# Patient Record
Sex: FEMALE | Race: WHITE | NOT HISPANIC OR LATINO | URBAN - NONMETROPOLITAN AREA
[De-identification: names, ages, dates, MRNs, and addresses within clinical notes are randomized per-mention and may not be internally consistent; named-entity substitution may affect disease eponyms.]

---

## 2015-09-17 LAB — HM PAP SMEAR: NORMAL

## 2017-01-16 VITALS
HEIGHT: 68 IN | WEIGHT: 193 LBS | DIASTOLIC BLOOD PRESSURE: 70 MMHG | BODY MASS INDEX: 29.25 KG/M2 | SYSTOLIC BLOOD PRESSURE: 128 MMHG

## 2017-01-18 ENCOUNTER — OFFICE VISIT (OUTPATIENT)
Dept: OBSTETRICS AND GYNECOLOGY | Facility: CLINIC | Age: 27
End: 2017-01-18

## 2017-01-18 ENCOUNTER — RESULTS ENCOUNTER (OUTPATIENT)
Dept: OBSTETRICS AND GYNECOLOGY | Facility: CLINIC | Age: 27
End: 2017-01-18

## 2017-01-18 VITALS
WEIGHT: 205 LBS | DIASTOLIC BLOOD PRESSURE: 72 MMHG | HEIGHT: 69 IN | BODY MASS INDEX: 30.36 KG/M2 | SYSTOLIC BLOOD PRESSURE: 122 MMHG

## 2017-01-18 DIAGNOSIS — N97.0 INFERTILITY ASSOCIATED WITH ANOVULATION: ICD-10-CM

## 2017-01-18 DIAGNOSIS — N97.0 INFERTILITY ASSOCIATED WITH ANOVULATION: Primary | ICD-10-CM

## 2017-01-18 PROCEDURE — 99213 OFFICE O/P EST LOW 20 MIN: CPT | Performed by: OBSTETRICS & GYNECOLOGY

## 2017-01-18 NOTE — MR AVS SNAPSHOT
Pavithra Carter   1/18/2017 1:40 PM   Office Visit    Dept Phone:  [phone]   Encounter #:  [id number]    Provider:  Zaria Thompson MD   Department:  [facility]                Your Full Care Plan              Today's Medication Changes          These changes are accurate as of: 1/18/17  2:33 PM.  If you have any questions, ask your nurse or doctor.               New Medication(s)Ordered:     clomiPHENE 50 MG tablet   Commonly known as:  CLOMID   1 po days 5-9   Started by:  Zaria Thompson MD            Where to Get Your Medications      These medications were sent to [facility] [place], [place] - [address] [facility] - [phone]  - [phone] FX  [address] [facility] [place] [address]     Phone:  [phone]     clomiPHENE 50 MG tablet                  Your Updated Medication List          This list is accurate as of: 1/18/17  2:33 PM.  Always use your most recent med list.                clomiPHENE 50 MG tablet   Commonly known as:  CLOMID   1 po days 5-9               You Were Diagnosed With        Codes Comments    Infertility associated with anovulation    -  Primary ICD-10-CM: N97.0  ICD-9-CM: 628.0       Instructions    Clomiphene tablets  What is this medicine?  CLOMIPHENE (KLOE mi feen) is a fertility drug that increases the chance of pregnancy. It helps women ovulate (produce a mature egg) during their cycle.  This medicine may be used for other purposes; ask your health care provider or pharmacist if you have questions.  What should I tell my health care provider before I take this medicine?  They need to know if you have any of these conditions:  -adrenal gland disease  -blood vessel disease or blood clots  -cyst on the ovary  -endometriosis  -liver disease  -ovarian cancer  -pituitary gland disease  -vaginal bleeding that has not been evaluated  -an unusual or allergic reaction to clomiphene, other medicines, foods, dyes, or  preservatives  -pregnant (should not be used if you are already pregnant)  -breast-feeding  How should I use this medicine?  Take this medicine by mouth with a glass of water. Follow the directions on the prescription label. Take exactly as directed for the exact number of days prescribed. Take your doses at regular intervals. Most women take this medicine for a 5 day period, but the length of treatment may be adjusted. Your doctor will give you a start date for this medication and will give you instructions on proper use. Do not take your medicine more often than directed.  Talk to your pediatrician regarding the use of this medicine in children. Special care may be needed.  Overdosage: If you think you have taken too much of this medicine contact a poison control center or emergency room at once.  NOTE: This medicine is only for you. Do not share this medicine with others.  What if I miss a dose?  If you miss a dose, take it as soon as you can. If it is almost time for your next dose, take only that dose. Do not take double or extra doses.  What may interact with this medicine?  -herbal or dietary supplements, like blue cohosh, black cohosh, chasteberry, or DHEA  -prasterone  This list may not describe all possible interactions. Give your health care provider a list of all the medicines, herbs, non-prescription drugs, or dietary supplements you use. Also tell them if you smoke, drink alcohol, or use illegal drugs. Some items may interact with your medicine.  What should I watch for while using this medicine?  Make sure you understand how and when to use this medicine. You need to know when you are ovulating and when to have sexual intercourse. This will increase the chance of a pregnancy.  Visit your doctor or health care professional for regular checks on your progress. You may need tests to check the hormone levels in your blood or you may have to use home-urine tests to check for ovulation. Try to keep any  appointments.  Compared to other fertility treatments, this medicine does not greatly increase your chances of having multiple babies. An increased chance of having twins may occur in roughly 5 out of every 100 women who take this medication.  Stop taking this medicine at once and contact your doctor or health care professional if you think you are pregnant.  This medicine is not for long-term use. Most women that benefit from this medicine do so within the first three cycles (months). Your doctor or health care professional will monitor your condition. This medicine is usually used for a total of 6 cycles of treatment.  You may get drowsy or dizzy. Do not drive, use machinery, or do anything that needs mental alertness until you know how this drug affects you. Do not stand or sit up quickly. This reduces the risk of dizzy or fainting spells.  Drinking alcoholic beverages or smoking tobacco may decrease your chance of becoming pregnant. Limit or stop alcohol and tobacco use during your fertility treatments.  What side effects may I notice from receiving this medicine?  Side effects that you should report to your doctor or health care professional as soon as possible:  -allergic reactions like skin rash, itching or hives, swelling of the face, lips, or tongue  -breathing problems  -changes in vision  -fluid retention  -nausea, vomiting  -pelvic pain or bloating  -severe abdominal pain  -sudden weight gain  Side effects that usually do not require medical attention (report to your doctor or health care professional if they continue or are bothersome):  -breast discomfort  -hot flashes  -mild pelvic discomfort  -mild nausea  This list may not describe all possible side effects. Call your doctor for medical advice about side effects. You may report side effects to FDA at 3-433-FDA-7387.  Where should I keep my medicine?  Keep out of the reach of children.  Store at room temperature between 15 and 30 degrees C (59 and 86  "degrees F). Protect from heat, light, and moisture. Throw away any unused medicine after the expiration date.  NOTE: This sheet is a summary. It may not cover all possible information. If you have questions about this medicine, talk to your doctor, pharmacist, or health care provider.     © 2016, Elsevier/[employer] Standard. (2009 22:21:06)       Patient Instructions History      Upcoming Appointments     Visit Type Date Time Department    GYN FOLLOW UP 2017  1:40 PM [facility]      [employer] Signup     [facility] [employer] allows you to send messages to your doctor, view your test results, renew your prescriptions, schedule appointments, and more. To sign up, go to [employer] and click on the Sign Up Now link in the New User? box. Enter your [employer] Activation Code exactly as it appears below along with the last four digits of your Social Security Number and your Date of Birth () to complete the sign-up process. If you do not sign up before the expiration date, you must request a new code.    [employer] Activation Code: [email or web address]  Expires: 2017  2:33 PM    If you have questions, you can email [email or web address] or call [phone] to talk to our [employer] staff. Remember, [employer] is NOT to be used for urgent needs. For medical emergencies, dial 911.               Other Info from Your Visit           Allergies     No Known Allergies      Reason for Visit     Follow-up follow up discuss HSG, wants to discuss Clomid      Vital Signs     Blood Pressure Height Weight Last Menstrual Period Breastfeeding? Body Mass Index    122/72 69\" (175.3 cm) 205 lb (93 kg) 2016 No 30.27 kg/m2    Smoking Status                   Never Smoker           Problems and Diagnoses Noted     Infertility associated with anovulation    -  Primary        "

## 2017-01-18 NOTE — PATIENT INSTRUCTIONS
Clomiphene tablets  What is this medicine?  CLOMIPHENE (KLOE mi feen) is a fertility drug that increases the chance of pregnancy. It helps women ovulate (produce a mature egg) during their cycle.  This medicine may be used for other purposes; ask your health care provider or pharmacist if you have questions.  What should I tell my health care provider before I take this medicine?  They need to know if you have any of these conditions:  -adrenal gland disease  -blood vessel disease or blood clots  -cyst on the ovary  -endometriosis  -liver disease  -ovarian cancer  -pituitary gland disease  -vaginal bleeding that has not been evaluated  -an unusual or allergic reaction to clomiphene, other medicines, foods, dyes, or preservatives  -pregnant (should not be used if you are already pregnant)  -breast-feeding  How should I use this medicine?  Take this medicine by mouth with a glass of water. Follow the directions on the prescription label. Take exactly as directed for the exact number of days prescribed. Take your doses at regular intervals. Most women take this medicine for a 5 day period, but the length of treatment may be adjusted. Your doctor will give you a start date for this medication and will give you instructions on proper use. Do not take your medicine more often than directed.  Talk to your pediatrician regarding the use of this medicine in children. Special care may be needed.  Overdosage: If you think you have taken too much of this medicine contact a poison control center or emergency room at once.  NOTE: This medicine is only for you. Do not share this medicine with others.  What if I miss a dose?  If you miss a dose, take it as soon as you can. If it is almost time for your next dose, take only that dose. Do not take double or extra doses.  What may interact with this medicine?  -herbal or dietary supplements, like blue cohosh, black cohosh, chasteberry, or DHEA  -prasterone  This list may not describe  all possible interactions. Give your health care provider a list of all the medicines, herbs, non-prescription drugs, or dietary supplements you use. Also tell them if you smoke, drink alcohol, or use illegal drugs. Some items may interact with your medicine.  What should I watch for while using this medicine?  Make sure you understand how and when to use this medicine. You need to know when you are ovulating and when to have sexual intercourse. This will increase the chance of a pregnancy.  Visit your doctor or health care professional for regular checks on your progress. You may need tests to check the hormone levels in your blood or you may have to use home-urine tests to check for ovulation. Try to keep any appointments.  Compared to other fertility treatments, this medicine does not greatly increase your chances of having multiple babies. An increased chance of having twins may occur in roughly 5 out of every 100 women who take this medication.  Stop taking this medicine at once and contact your doctor or health care professional if you think you are pregnant.  This medicine is not for long-term use. Most women that benefit from this medicine do so within the first three cycles (months). Your doctor or health care professional will monitor your condition. This medicine is usually used for a total of 6 cycles of treatment.  You may get drowsy or dizzy. Do not drive, use machinery, or do anything that needs mental alertness until you know how this drug affects you. Do not stand or sit up quickly. This reduces the risk of dizzy or fainting spells.  Drinking alcoholic beverages or smoking tobacco may decrease your chance of becoming pregnant. Limit or stop alcohol and tobacco use during your fertility treatments.  What side effects may I notice from receiving this medicine?  Side effects that you should report to your doctor or health care professional as soon as possible:  -allergic reactions like skin rash,  itching or hives, swelling of the face, lips, or tongue  -breathing problems  -changes in vision  -fluid retention  -nausea, vomiting  -pelvic pain or bloating  -severe abdominal pain  -sudden weight gain  Side effects that usually do not require medical attention (report to your doctor or health care professional if they continue or are bothersome):  -breast discomfort  -hot flashes  -mild pelvic discomfort  -mild nausea  This list may not describe all possible side effects. Call your doctor for medical advice about side effects. You may report side effects to FDA at 9-112-FDA-5497.  Where should I keep my medicine?  Keep out of the reach of children.  Store at room temperature between 15 and 30 degrees C (59 and 86 degrees F). Protect from heat, light, and moisture. Throw away any unused medicine after the expiration date.  NOTE: This sheet is a summary. It may not cover all possible information. If you have questions about this medicine, talk to your doctor, pharmacist, or health care provider.     © 2016, Elsevier/Gold Standard. (2009-03-30 22:21:06)

## 2017-01-19 NOTE — PROGRESS NOTES
17    Pavithra Averyton    1990      Chief Complaint   Patient presents with   • Follow-up     follow up discuss HSG, wants to discuss Clomid       Patient is 26 y.o.  here for evaluation of infertility.  The patient gives a history of infertility for the last 3 years.  The patient previously conceived in  unexpectedly.  The patient had a  section.  The patient reports regular menses occurring every 29 days.  Menses last 4 days occasionally painful in nature but not heavy.  The patient has been using ovulatory kits and reports minimal detection of ovulation over the last 4 months.  The patient took Clomid in September and reports positive ovulation with confirmation with a progesterone level.  The patient also had an HSG on 2016 which revealed no obstruction of the fallopian tubes.  The patient's current partner is also the father of her previous child.  He has also had a urologic examination and semen analysis which is normal.  The patient is currently on a multivitamin with folic acid.  The patient's last menstrual period was 2016.      History reviewed. No pertinent past medical history.        No current outpatient prescriptions on file prior to visit.     No current facility-administered medications on file prior to visit.          Past Surgical History   Procedure Laterality Date   •  section  2009           Social History     Social History   • Marital status:      Spouse name: N/A   • Number of children: N/A   • Years of education: N/A     Social History Main Topics   • Smoking status: Never Smoker   • Smokeless tobacco: Never Used   • Alcohol use No   • Drug use: No   • Sexual activity: Yes     Partners: Male     Other Topics Concern   • None     Social History Narrative         Review of Systems   Constitutional: Negative.    HENT: Negative.    Eyes: Negative.    Respiratory: Negative.    Cardiovascular: Negative.    Gastrointestinal: Negative.   "  Endocrine: Negative.    Musculoskeletal: Negative.    Skin: Negative.    Allergic/Immunologic: Negative.    Neurological: Negative.    Hematological: Negative.    Psychiatric/Behavioral: Negative.                  Vitals:    17 1406   BP: 122/72   Weight: 205 lb (93 kg)   Height: 69\" (175.3 cm)       Physical Exam   Constitutional: She is oriented to person, place, and time. She appears well-developed and well-nourished. No distress.   HENT:   Head: Normocephalic and atraumatic.   Eyes: Conjunctivae are normal.   Neck: Normal range of motion. Neck supple. No thyromegaly present.   Abdominal: Soft. Bowel sounds are normal. She exhibits no distension and no mass. There is no tenderness. There is no rebound and no guarding.   Neurological: She is alert and oriented to person, place, and time.   Skin: Skin is warm and dry. She is not diaphoretic.   Psychiatric: She has a normal mood and affect. Her behavior is normal.   Nursing note and vitals reviewed.        1. Infertility associated with anovulation/unknown  Patient has a three-year history of infertility with evaluation as noted above.  The various options were discussed with the patient.  Decision was made to proceed with a trial of Clomid therapy.  Instructions and precautions were given.  The patient understands the need for continued urine ovulatory assessment every month.  The patient also will return for a serum progesterone level on day 21 or 22 of her cycle.  If the patient does have ovulation and no conception over the next 3-4 cycles the patient is to consider possible diagnostic laparoscopy.  The patient may also consider referral to a reproductive endocrinologist.  - Progesterone; Future      Requested Prescriptions     Signed Prescriptions Disp Refills   • clomiPHENE (CLOMID) 50 MG tablet 5 tablet 3     Si po days 5-9       Return in about 4 months (around 2017).    Zaria Thompson MD  "

## 2017-02-17 LAB — PROGEST SERPL-MCNC: 18.7 NG/ML

## 2017-03-17 DIAGNOSIS — N83.9 PROBLEMS WITH OVULATION: Primary | ICD-10-CM

## 2017-03-18 LAB — PROGEST SERPL-MCNC: 16.2 NG/ML

## 2017-04-06 VITALS
DIASTOLIC BLOOD PRESSURE: 70 MMHG | BODY MASS INDEX: 29.25 KG/M2 | SYSTOLIC BLOOD PRESSURE: 128 MMHG | WEIGHT: 193 LBS | HEIGHT: 68 IN

## 2017-04-25 ENCOUNTER — INITIAL PRENATAL (OUTPATIENT)
Dept: OBSTETRICS AND GYNECOLOGY | Facility: CLINIC | Age: 27
End: 2017-04-25

## 2017-04-25 VITALS — DIASTOLIC BLOOD PRESSURE: 82 MMHG | WEIGHT: 207 LBS | SYSTOLIC BLOOD PRESSURE: 136 MMHG | BODY MASS INDEX: 30.57 KG/M2

## 2017-04-25 DIAGNOSIS — Z34.90 PREGNANCY, UNSPECIFIED GESTATIONAL AGE: Primary | ICD-10-CM

## 2017-04-25 DIAGNOSIS — O30.041 DICHORIONIC DIAMNIOTIC TWIN PREGNANCY IN FIRST TRIMESTER: ICD-10-CM

## 2017-04-25 PROBLEM — O30.049 DICHORIONIC DIAMNIOTIC TWIN GESTATION: Status: ACTIVE | Noted: 2017-04-25

## 2017-04-25 PROCEDURE — 0501F PRENATAL FLOW SHEET: CPT | Performed by: MIDWIFE

## 2017-04-25 PROCEDURE — 76817 TRANSVAGINAL US OBSTETRIC: CPT | Performed by: MIDWIFE

## 2017-04-25 RX ORDER — PRENATAL VIT NO.126/IRON/FOLIC 28MG-0.8MG
TABLET ORAL DAILY
COMMUNITY

## 2017-04-25 NOTE — PROGRESS NOTES
@SUBJECTIVEBEGIN  Chief Complaint   Patient presents with   • Initial Prenatal Visit     lmp 17, no complaints, TWINS       Pavithra Carter is a 26 y.o. year old .  Patient's last menstrual period was 2017 (approximate).  She presents to be seen to initiate prenatal care. She had HSG in November. C/O a lot of nausea, daily, occasional vomiting. Trying to eat small frequent meals and change vitamins to different times.    Smoking status: Never Smoker                                                              Smokeless status: Never Used                          The following portions of the patient's history were reviewed and updated as appropriate:vital signs, allergies, current medications, past medical history, past social history, past surgical history and problem list.    EXAM:  HEENT: WNL  Thyroid: no masses  Heart: RRR without murmur  Lungs: CTAB  Abdomen: soft nontender  Perineum: normal  Vagina: pink, moist  Cervix: Closed thick -CMT  Extremities; no edema    Lab Review   No data reviewed    Imaging   Pelvic ultrasound report  Twin A 8w3d, Twin B 8w2d, dichorionic and diamniotic    ASSESSMENT  1. IUP at 8w2d  2. Supervision of pregnancy  3. Twin pregnancy - DC/DA    PLAN  1. The problem list for pregnancy was initiated today  2. Tests ordered today:  Orders Placed This Encounter   Procedures   • US Ob Transvaginal     Order Specific Question:   Reason for Exam:     Answer:   NOB, dates   • Obstetric Panel     3. Testing for GC / Chlamydia was done today  4. Information reviewed: exercise in pregnancy, nutrition in pregnancy, weight gain in pregnancy, work and travel restrictions during pregnancy, list of OTC medications acceptable in pregnancy and call coverage groups    Follow up: 4 week(s)    This note was electronically signed.  KARLA Doshi  2017

## 2017-04-26 LAB
ABO GROUP BLD: (no result)
BASOPHILS # BLD AUTO: 0 X10E3/UL (ref 0–0.2)
BASOPHILS NFR BLD AUTO: 0 %
BLD GP AB SCN SERPL QL: NEGATIVE
EOSINOPHIL # BLD AUTO: 0.1 X10E3/UL (ref 0–0.4)
EOSINOPHIL NFR BLD AUTO: 1 %
ERYTHROCYTE [DISTWIDTH] IN BLOOD BY AUTOMATED COUNT: 14.1 % (ref 12.3–15.4)
HBV SURFACE AG SERPL QL IA: NEGATIVE
HCT VFR BLD AUTO: 42.3 % (ref 34–46.6)
HGB BLD-MCNC: 14.7 G/DL (ref 11.1–15.9)
IMM GRANULOCYTES # BLD: 0 X10E3/UL (ref 0–0.1)
IMM GRANULOCYTES NFR BLD: 0 %
LYMPHOCYTES # BLD AUTO: 2.5 X10E3/UL (ref 0.7–3.1)
LYMPHOCYTES NFR BLD AUTO: 18 %
MCH RBC QN AUTO: 28.9 PG (ref 26.6–33)
MCHC RBC AUTO-ENTMCNC: 34.8 G/DL (ref 31.5–35.7)
MCV RBC AUTO: 83 FL (ref 79–97)
MONOCYTES # BLD AUTO: 0.8 X10E3/UL (ref 0.1–0.9)
MONOCYTES NFR BLD AUTO: 6 %
NEUTROPHILS # BLD AUTO: 10.5 X10E3/UL (ref 1.4–7)
NEUTROPHILS NFR BLD AUTO: 75 %
PLATELET # BLD AUTO: 287 X10E3/UL (ref 150–379)
RBC # BLD AUTO: 5.08 X10E6/UL (ref 3.77–5.28)
RH BLD: POSITIVE
RPR SER QL: NON REACTIVE
RUBV IGG SERPL IA-ACNC: 1.45 INDEX
WBC # BLD AUTO: 14 X10E3/UL (ref 3.4–10.8)

## 2017-05-01 ENCOUNTER — TELEPHONE (OUTPATIENT)
Dept: OBSTETRICS AND GYNECOLOGY | Facility: CLINIC | Age: 27
End: 2017-05-01

## 2017-05-01 RX ORDER — PROMETHAZINE HYDROCHLORIDE 25 MG/1
12.5 TABLET ORAL EVERY 6 HOURS PRN
Qty: 20 TABLET | Refills: 0 | Status: SHIPPED | OUTPATIENT
Start: 2017-05-01 | End: 2017-11-09

## 2017-05-03 DIAGNOSIS — O30.041 DICHORIONIC DIAMNIOTIC TWIN PREGNANCY IN FIRST TRIMESTER: ICD-10-CM

## 2017-05-23 ENCOUNTER — ROUTINE PRENATAL (OUTPATIENT)
Dept: OBSTETRICS AND GYNECOLOGY | Facility: CLINIC | Age: 27
End: 2017-05-23

## 2017-05-23 VITALS — SYSTOLIC BLOOD PRESSURE: 136 MMHG | BODY MASS INDEX: 31.45 KG/M2 | WEIGHT: 213 LBS | DIASTOLIC BLOOD PRESSURE: 74 MMHG

## 2017-05-23 DIAGNOSIS — O30.041 DICHORIONIC DIAMNIOTIC TWIN PREGNANCY IN FIRST TRIMESTER: ICD-10-CM

## 2017-05-23 PROCEDURE — 0502F SUBSEQUENT PRENATAL CARE: CPT | Performed by: MIDWIFE

## 2017-06-20 ENCOUNTER — ROUTINE PRENATAL (OUTPATIENT)
Dept: OBSTETRICS AND GYNECOLOGY | Facility: CLINIC | Age: 27
End: 2017-06-20

## 2017-06-20 VITALS — SYSTOLIC BLOOD PRESSURE: 128 MMHG | WEIGHT: 211 LBS | BODY MASS INDEX: 31.16 KG/M2 | DIASTOLIC BLOOD PRESSURE: 70 MMHG

## 2017-06-20 DIAGNOSIS — O30.041 DICHORIONIC DIAMNIOTIC TWIN PREGNANCY IN FIRST TRIMESTER: ICD-10-CM

## 2017-06-20 DIAGNOSIS — Z34.92 NORMAL PREGNANCY, SECOND TRIMESTER: Primary | ICD-10-CM

## 2017-06-20 PROCEDURE — 99213 OFFICE O/P EST LOW 20 MIN: CPT | Performed by: NURSE PRACTITIONER

## 2017-06-20 NOTE — PROGRESS NOTES
Chief Complaint   Patient presents with   • Routine Prenatal Visit     Twins.. Pt states that shes having some swelling in hands & face        HPI  , 16w3d reports thinks some facial, [place]  Appetite is fine - some nause 3 x wk -     Working full time  Hx of preeclampsia   Hx C/S      ROS  /70  Wt 211 lb (95.7 kg)  LMP 2017 (Approximate)  BMI 31.16 kg/m2 -See Prenatal Assessment    EXAM  Constitutional:  calm  HEENT:  No obvious facial swelling   Heart/Lungs:  Abdomen:  Fundal ht/ FHT's / FM see prenatal flow sheet  Extremeties:  Neg edema  V/E:     MDM  Impression: Supervision of pregnancy   Previous C/S with planned repeat C/S  Twin pregnancy - DC/DA   Tests done today: u/s for FHT's    Topics discussed: adeq rest and fluids - nutrition rev'd - option for phenergan   Tests next visit: U/s - anatomical structure

## 2017-07-10 ENCOUNTER — ROUTINE PRENATAL (OUTPATIENT)
Dept: OBSTETRICS AND GYNECOLOGY | Facility: CLINIC | Age: 27
End: 2017-07-10

## 2017-07-10 VITALS — WEIGHT: 217 LBS | SYSTOLIC BLOOD PRESSURE: 134 MMHG | DIASTOLIC BLOOD PRESSURE: 68 MMHG | BODY MASS INDEX: 32.05 KG/M2

## 2017-07-10 DIAGNOSIS — O30.042 DICHORIONIC DIAMNIOTIC TWIN PREGNANCY IN SECOND TRIMESTER: ICD-10-CM

## 2017-07-10 PROCEDURE — 0502F SUBSEQUENT PRENATAL CARE: CPT | Performed by: NURSE PRACTITIONER

## 2017-07-10 NOTE — PROGRESS NOTES
Chief Complaint   Patient presents with   • Routine Prenatal Visit     swelling and veins in legs         HPI  , 19w2d reports doing well     ROS  /68  Wt 217 lb (98.4 kg)  LMP 2017 (Approximate)  BMI 32.05 kg/m2 -See Prenatal Assessment    EXAM  Constitutional:    HEENT:  Heart/Lungs:  Abdomen:  Fundal ht/ FHT's / FM see prenatal flow sheet  Extremeties:    V/E:     MDM  Impression: Supervision of pregnancy   Twin pregnancy - DC/DA  prominent superficial varicosity    Tests done today: u/s for anantomical structure     Topics discussed: LE varicosity / compression hose / work /   U/S BOY /  GIRL   Names: ? Boy  / Dorothy Girl    Tests next visit: none

## 2017-08-07 ENCOUNTER — ROUTINE PRENATAL (OUTPATIENT)
Dept: OBSTETRICS AND GYNECOLOGY | Facility: CLINIC | Age: 27
End: 2017-08-07

## 2017-08-07 VITALS — DIASTOLIC BLOOD PRESSURE: 70 MMHG | SYSTOLIC BLOOD PRESSURE: 128 MMHG | BODY MASS INDEX: 32.34 KG/M2 | WEIGHT: 219 LBS

## 2017-08-07 DIAGNOSIS — O30.042 DICHORIONIC DIAMNIOTIC TWIN PREGNANCY IN SECOND TRIMESTER: ICD-10-CM

## 2017-08-07 PROCEDURE — 0502F SUBSEQUENT PRENATAL CARE: CPT | Performed by: OBSTETRICS & GYNECOLOGY

## 2017-08-07 NOTE — PROGRESS NOTES
No chief complaint on file.       HPI:   , 23w2d gestation reports doing well, Di/DI twins, prior 39 week C/S with preeclampsisa    ROS:  See Prenatal Episode/Flowsheet  /70  Wt 219 lb (99.3 kg)  LMP 2017 (Approximate)  BMI 32.34 kg/m2     EXAM:  EXTREMITIES:  No swelling-See Prenatal Episode/Flowsheet    ABDOMEN:  FHTs/Movement noted-See Prenatal Episode/Flowsheet    URINE GLUCOSE/PROTEIN:  See Prenatal Episode/Flowsheet    PELVIC EXAM:  See Prenatal Episode/Flowsheet    MDM:    Lab Results   Component Value Date    HGB 14.7 2017    HEPBSAG Negative 2017    ABO A 2017    RH Positive 2017    ABSCRN Negative 2017       Gluocla CBC next time

## 2017-08-07 NOTE — PATIENT INSTRUCTIONS
Multiple Pregnancy  Having a multiple pregnancy means you are carrying twins, triplets, or more. The majority of multiple pregnancies are twins. Naturally conceiving triplets or more (higher-order multiples) is quite rare.  Multiple pregnancies can sometimes be riskier than single pregnancies. You are more likely to have certain problems. For example, you are at higher risk for high blood pressure during pregnancy (preeclampsia). You may need to have more frequent appointments for prenatal care.   CAUSES   Sometimes your body releases more than one egg at a time. Having more than one egg fertilized by more than one sperm is the most common type of multiple pregnancy. Twins produced this way are fraternal. They are no more alike than other siblings.  Multiple pregnancies also result when one sperm fertilizes one egg and then that egg divides into more than one embryo. These are identical twins or triplets. Identical multiples are always the same gender and look very much alike.  RISK FACTORS  You may be at higher risk for having a multiple pregnancy if:  · You are older than 35.  · You have already had four or more children.  · You have a family history of multiple pregnancy.  · You have had fertility treatment.  SIGNS AND SYMPTOMS  Early signs and symptoms of multiple pregnancy include:  · Rapid weight gain in the first 3 months of pregnancy (first trimester).  · The uterus measuring larger than normal for your stage of pregnancy.  · More severe nausea.  · A higher-than-normal level of human chorionic gonadotropin (HCG). This is a hormone that your body produces in early pregnancy.  DIAGNOSIS   Your health care provider may suspect a multiple pregnancy from your symptoms and a physical exam. The results of your HCG blood test may also suggest a multiple pregnancy. Your health care provider will confirm that you are carrying multiples by doing an ultrasound exam. This exam involves using sound waves and a computer to  get an image of the inside of your uterus. An ultrasound exam can confirm a multiple pregnancy after you have been pregnant for 6-8 weeks.  TREATMENT   If you have a multiple pregnancy, you may need:  · Early screening for possible birth defects or genetic abnormalities.  · Frequent pelvic exams to check for signs of early labor.  · Frequent ultrasound exams during the second trimester.  · Frequent checks for high blood pressure and for protein in your urine. These are signs of preeclampsia.  · An anti-inflammatory medicine (corticosteroid) if you go into early labor. This medicine helps your babies' lungs mature.  · Magnesium sulfate to prevent cerebral palsy in your babies if you are expected to deliver before 32 weeks.  ·  delivery if vaginal delivery is too dangerous.  HOME CARE INSTRUCTIONS   Because your pregnancy may be considered high risk, you have to work closely with your health care team. You may also need to make some lifestyle changes. These may include the following:  · Increase your nutrition.  ¨ Gaining about 40-50 lb (18-23 kg) is recommended when you are pregnant with multiples. Try to gain 1 lb (0.5 kg) a week for the first 20 weeks of your pregnancy.  ¨ Your health care provider will let you know how many calories you should add to your diet each day.  ¨ Eat healthy snacks often throughout the day. This can add calories and reduce nausea.  · Drink enough fluid to keep your urine clear or pale yellow.  · Take your prenatal vitamins.  · Take 4248-9546 mg of calcium daily starting at the 20th week of pregnancy until you deliver your babies.  · By 20-24 weeks, you may need to limit your activities.  ¨ Avoid strenuous activity and work.  ¨ Ask your health care provider when you should stop having sexual intercourse.  ¨ Rest often.  · Do not smoke.  · Do not drink alcohol.  · Arrange for extra help around the house.  · Keep all your prenatal appointments.  SEEK MEDICAL CARE IF:  · You have  dizziness.  · You have mild pelvic cramps, pelvic pressure, or nagging pain in the abdominal or low back area.  · You have persistent nausea, vomiting, or diarrhea.  · You have a bad smelling vaginal discharge.  · You have pain with urination.  · You are having trouble gaining weight.  · You notice increased swelling in your face, hands, legs, or ankles.  · You have a fever.  SEEK IMMEDIATE MEDICAL CARE IF:   · You are leaking fluid from your vagina.  · You have spotting or bleeding from your vagina.  · You have severe abdominal cramping or pain.  · You have rapid weight gain or loss.  · You vomit blood or material that looks like coffee grounds.  · You are exposed to [place] measles and have never had them.  · You are exposed to fifth disease or chickenpox.  · You develop a severe headache.  · You have shortness of breath.     This information is not intended to replace advice given to you by your health care provider. Make sure you discuss any questions you have with your health care provider.     Document Released: 09/26/2009 Document Revised: 04/10/2017 Document Reviewed: 11/21/2014  [employer] Interactive Patient Education ©2017 [employer]

## 2017-08-15 ENCOUNTER — ROUTINE PRENATAL (OUTPATIENT)
Dept: OBSTETRICS AND GYNECOLOGY | Facility: CLINIC | Age: 27
End: 2017-08-15

## 2017-08-15 VITALS — DIASTOLIC BLOOD PRESSURE: 66 MMHG | SYSTOLIC BLOOD PRESSURE: 128 MMHG | WEIGHT: 219 LBS | BODY MASS INDEX: 32.34 KG/M2

## 2017-08-15 DIAGNOSIS — O26.892 VAGINAL DISCHARGE IN PREGNANCY, SECOND TRIMESTER: ICD-10-CM

## 2017-08-15 DIAGNOSIS — O22.00 VARICOSE VEINS DURING PREGNANCY, ANTEPARTUM: ICD-10-CM

## 2017-08-15 DIAGNOSIS — O22.42 HEMORRHOIDS DURING PREGNANCY IN SECOND TRIMESTER: Primary | ICD-10-CM

## 2017-08-15 DIAGNOSIS — N89.8 VAGINAL DISCHARGE IN PREGNANCY, SECOND TRIMESTER: ICD-10-CM

## 2017-08-15 DIAGNOSIS — O30.042 DICHORIONIC DIAMNIOTIC TWIN PREGNANCY IN SECOND TRIMESTER: ICD-10-CM

## 2017-08-15 PROBLEM — O22.40 HEMORRHOIDS IN PREGNANCY: Status: ACTIVE | Noted: 2017-08-15

## 2017-08-15 PROCEDURE — 0502F SUBSEQUENT PRENATAL CARE: CPT | Performed by: MIDWIFE

## 2017-08-15 NOTE — PROGRESS NOTES
"Chief Complaint   Patient presents with   • Pregnancy Problem     hemorrhoids x's 3 days, has tired \"every OTC treatment\".         HPI:  , 24w3d reports painful hemorrhoid for 3 days. Hasn't noticed any bleeding. States bowel movemnets have been medium to soft. Has tried Prep H cream and supp, Tucks, and soaking in bath. Also noticed a sore area on left labia that looks like a scratch yesterday. She denies any unusual vaginal discharge or itching, burning, or odor.    ROS:    /66  Wt 219 lb (99.3 kg)  LMP 2017 (Approximate)  BMI 32.34 kg/m2 -See Prenatal Assessment    Exam:    Abdomen: Soft, nontender. Fundal ht/ FHT's / FM see prenatal flow sheet, states babies are active  Extremities:  See prenatal flowsheet, numerous varicosities  Labia: abrasion in skin fold left labia, varicosities also noted  Vagina: small amount of white discharge  Rectum: 2cm anterior hemorrhoid, pink with several darkened areas, soft to touch      Assessment:    IUP at 24w3d  Hemorrhoid    MDM:    Impression:            Supervision of pregnancy  Twin pregnancy - DC/DA  Hemorrhoid   Varicosities  Tests done today:  none  Topics discussed:  Stool softeners BID, Anusol HC cream BID, sitz baths BID  Tests next visit:      none  Next visit:               1 week or sooner if worsens       Joan Baltazar CNM  8/15/2017    "

## 2017-08-22 LAB
A VAGINAE DNA VAG QL NAA+PROBE: ABNORMAL SCORE
BVAB2 DNA VAG QL NAA+PROBE: ABNORMAL SCORE
C ALBICANS DNA VAG QL NAA+PROBE: POSITIVE
C GLABRATA DNA VAG QL NAA+PROBE: NEGATIVE
C TRACH RRNA SPEC QL NAA+PROBE: NEGATIVE
MEGA1 DNA VAG QL NAA+PROBE: ABNORMAL SCORE
N GONORRHOEA RRNA SPEC QL NAA+PROBE: NEGATIVE
T VAGINALIS RRNA SPEC QL NAA+PROBE: NEGATIVE

## 2017-08-23 ENCOUNTER — ROUTINE PRENATAL (OUTPATIENT)
Dept: OBSTETRICS AND GYNECOLOGY | Facility: CLINIC | Age: 27
End: 2017-08-23

## 2017-08-23 VITALS — WEIGHT: 220 LBS | DIASTOLIC BLOOD PRESSURE: 70 MMHG | BODY MASS INDEX: 32.49 KG/M2 | SYSTOLIC BLOOD PRESSURE: 128 MMHG

## 2017-08-23 DIAGNOSIS — O30.042 DICHORIONIC DIAMNIOTIC TWIN PREGNANCY IN SECOND TRIMESTER: ICD-10-CM

## 2017-08-23 DIAGNOSIS — O22.42 HEMORRHOIDS IN PREGNANCY, SECOND TRIMESTER: ICD-10-CM

## 2017-08-23 DIAGNOSIS — O22.00 VARICOSE VEINS DURING PREGNANCY, ANTEPARTUM: ICD-10-CM

## 2017-08-23 LAB
ERYTHROCYTE [DISTWIDTH] IN BLOOD BY AUTOMATED COUNT: 14.6 % (ref 11.5–14.5)
EXTERNAL GTT 1 HOUR: 144
GLUCOSE 1H P 50 G GLC PO SERPL-MCNC: 144 MG/DL
HCT VFR BLD AUTO: 33 % (ref 37–47)
HGB BLD-MCNC: 11 G/DL (ref 12–16)
MCH RBC QN AUTO: 28.4 PG (ref 27–31)
MCHC RBC AUTO-ENTMCNC: 33.3 G/DL (ref 30–37)
MCV RBC AUTO: 85.3 FL (ref 81–99)
PLATELET # BLD AUTO: 265 10*3/MM3 (ref 130–400)
RBC # BLD AUTO: 3.87 10*6/MM3 (ref 4.2–5.4)
WBC # BLD AUTO: 11.03 10*3/MM3 (ref 4.8–10.8)

## 2017-08-23 PROCEDURE — 0502F SUBSEQUENT PRENATAL CARE: CPT | Performed by: OBSTETRICS & GYNECOLOGY

## 2017-08-23 NOTE — PROGRESS NOTES
Chief Complaint   Patient presents with   • Routine Prenatal Visit     No Complaints        HPI:   , 25w4d gestation reports doing well, doing  gluocla this am    ROS:  See Prenatal Episode/Flowsheet  /70  Wt 220 lb (99.8 kg)  LMP 2017 (Approximate)  BMI 32.49 kg/m2     EXAM:  EXTREMITIES:  No swelling-See Prenatal Episode/Flowsheet    ABDOMEN:  FHTs/Movement noted-See Prenatal Episode/Flowsheet    URINE GLUCOSE/PROTEIN:  See Prenatal Episode/Flowsheet    PELVIC EXAM:  See Prenatal Episode/Flowsheet    MDM:    Lab Results   Component Value Date    HGB 14.7 2017    HEPBSAG Negative 2017    ABO A 2017    RH Positive 2017    ABSCRN Negative 2017       Routine care, Di/Di twins, U/S next time

## 2017-08-23 NOTE — PATIENT INSTRUCTIONS
Prenatal Care  WHAT IS PRENATAL CARE?   Prenatal care is the process of caring for a pregnant woman before she gives birth. Prenatal care makes sure that she and her baby remain as healthy as possible throughout pregnancy. Prenatal care may be provided by a midwife, family practice health care provider, or a childbirth and pregnancy specialist (obstetrician). Prenatal care may include physical examinations, testing, treatments, and education on nutrition, lifestyle, and social support services.  WHY IS PRENATAL CARE SO IMPORTANT?   Early and consistent prenatal care increases the chance that you and your baby will remain healthy throughout your pregnancy. This type of care also decreases a baby's risk of being born too early (prematurely), or being born smaller than expected (small for gestational age). Any underlying medical conditions you may have that could pose a risk during your pregnancy are discussed during prenatal care visits. You will also be monitored regularly for any new conditions that may arise during your pregnancy so they can be treated quickly and effectively.  WHAT HAPPENS DURING PRENATAL CARE VISITS?  Prenatal care visits may include the following:  Discussion  Tell your health care provider about any new signs or symptoms you have experienced since your last visit. These might include:  · Nausea or vomiting.  · Increased or decreased level of energy.  · Difficulty sleeping.  · Back or leg pain.  · Weight changes.  · Frequent urination.  · Shortness of breath with physical activity.  · Changes in your skin, such as the development of a rash or itchiness.  · Vaginal discharge or bleeding.  · Feelings of excitement or nervousness.  · Changes in your baby's movements.  You may want to write down any questions or topics you want to discuss with your health care provider and bring them with you to your appointment.  Examination  During your first prenatal care visit, you will likely have a complete  physical exam. Your health care provider will often examine your vagina, cervix, and the position of your uterus, as well as check your heart, lungs, and other body systems. As your pregnancy progresses, your health care provider will measure the size of your uterus and your baby's position inside your uterus. He or she may also examine you for early signs of labor. Your prenatal visits may also include checking your blood pressure and, after about 10-12 weeks of pregnancy, listening to your baby's heartbeat.  Testing  Regular testing often includes:  · Urinalysis. This checks your urine for glucose, protein, or signs of infection.  · Blood count. This checks the levels of white and red blood cells in your body.  · Tests for sexually transmitted infections (STIs). Testing for STIs at the beginning of pregnancy is routinely done and is required in many states.  · Antibody testing. You will be checked to see if you are immune to certain illnesses, such as rubella, that can affect a developing fetus.  · Glucose screen. Around 24-28 weeks of pregnancy, your blood glucose level will be checked for signs of gestational diabetes. Follow-up tests may be recommended.  · Group B strep. This is a bacteria that is commonly found inside a woman's vagina. This test will inform your health care provider if you need an antibiotic to reduce the amount of this bacteria in your body prior to labor and childbirth.  · Ultrasound. Many pregnant women undergo an ultrasound screening around 18-20 weeks of pregnancy to evaluate the health of the fetus and check for any developmental abnormalities.  · HIV (human immunodeficiency virus) testing. Early in your pregnancy, you will be screened for HIV. If you are at high risk for HIV, this test may be repeated during your third trimester of pregnancy.  You may be offered other testing based on your age, personal or family medical history, or other factors.   HOW OFTEN SHOULD I PLAN TO SEE MY  HEALTH CARE PROVIDER FOR PRENATAL CARE?  Your prenatal care check-up schedule depends on any medical conditions you have before, or develop during, your pregnancy. If you do not have any underlying medical conditions, you will likely be seen for checkups:  · Monthly, during the first 6 months of pregnancy.  · Twice a month during months 7 and 8 of pregnancy.  · Weekly starting in the 9th month of pregnancy and until delivery.  If you develop signs of early labor or other concerning signs or symptoms, you may need to see your health care provider more often. Ask your health care provider what prenatal care schedule is best for you.  WHAT CAN I DO TO KEEP MYSELF AND MY BABY AS HEALTHY AS POSSIBLE DURING MY PREGNANCY?  · Take a prenatal vitamin containing 400 micrograms (0.4 mg) of folic acid every day. Your health care provider may also ask you to take additional vitamins such as iodine, vitamin D, iron, copper, and zinc.  · Take 5542-1355 mg of calcium daily starting at your 20th week of pregnancy until you deliver your baby.  · Make sure you are up to date on your vaccinations. Unless directed otherwise by your health care provider:    You should receive a tetanus, diphtheria, and pertussis (Tdap) vaccination between the 27th and 36th week of your pregnancy, regardless of when your last Tdap immunization occurred. This helps protect your baby from whooping cough (pertussis) after he or she is born.    You should receive an annual inactivated influenza vaccine (IIV) to help protect you and your baby from influenza. This can be done at any point during your pregnancy.  · Eat a well-rounded diet that includes:    Fresh fruits and vegetables.    Lean proteins.    Calcium-rich foods such as milk, yogurt, hard cheeses, and dark, leafy greens.    Whole grain breads.  · Do not eat seafood high in mercury, including:    Swordfish.    Tilefish.    Shark.    Niranjan mackerel.    More than 6 oz tuna per week.  · Do not eat:    Raw  or undercooked meats or eggs.    Unpasteurized foods, such as soft cheeses (brie, blue, or feta), juices, and milks.    Lunch meats.    Hot dogs that have not been heated until they are steaming.  · Drink enough water to keep your urine clear or pale yellow. For many women, this may be 10 or more 8 oz glasses of water each day. Keeping yourself hydrated helps deliver nutrients to your baby and may prevent the start of pre-term uterine contractions.  · Do not use any tobacco products including cigarettes, chewing tobacco, or electronic cigarettes. If you need help quitting, ask your health care provider.  · Do not drink beverages containing alcohol. No safe level of alcohol consumption during pregnancy has been determined.  · Do not use any illegal drugs. These can harm your developing baby or cause a miscarriage.  · Ask your health care provider or pharmacist before taking any prescription or over-the-counter medicines, herbs, or supplements.  · Limit your caffeine intake to no more than 200 mg per day.  · Exercise. Unless told otherwise by your health care provider, try to get 30 minutes of moderate exercise most days of the week. Do not  do high-impact activities, contact sports, or activities with a high risk of falling, such as horseback riding or downhill skiing.  · Get plenty of rest.  · Avoid anything that raises your body temperature, such as hot tubs and saunas.  · If you own a cat, do not empty its litter box. Bacteria contained in cat feces can cause an infection called toxoplasmosis. This can result in serious harm to the fetus.  · Stay away from chemicals such as insecticides, lead, mercury, and cleaning or paint products that contain solvents.  · Do not have any X-rays taken unless medically necessary.  · Take a childbirth and breastfeeding preparation class. Ask your health care provider if you need a referral or recommendation.     This information is not intended to replace advice given to you by  your health care provider. Make sure you discuss any questions you have with your health care provider.     Document Released: 12/20/2004 Document Revised: 04/10/2017 Document Reviewed: 03/04/2015  [employer] Interactive Patient Education ©2017 [employer]

## 2017-08-24 ENCOUNTER — RESULTS ENCOUNTER (OUTPATIENT)
Dept: OBSTETRICS AND GYNECOLOGY | Facility: CLINIC | Age: 27
End: 2017-08-24

## 2017-08-24 DIAGNOSIS — R73.09 ELEVATED GLUCOSE TOLERANCE TEST: ICD-10-CM

## 2017-08-24 DIAGNOSIS — R73.09 ELEVATED GLUCOSE TOLERANCE TEST: Primary | ICD-10-CM

## 2017-08-24 RX ORDER — FERROUS SULFATE 325(65) MG
325 TABLET ORAL
Qty: 30 TABLET | Refills: 5 | Status: SHIPPED | OUTPATIENT
Start: 2017-08-24 | End: 2017-09-23

## 2017-08-26 ENCOUNTER — RESULTS ENCOUNTER (OUTPATIENT)
Dept: OBSTETRICS AND GYNECOLOGY | Facility: CLINIC | Age: 27
End: 2017-08-26

## 2017-08-26 DIAGNOSIS — O30.042 DICHORIONIC DIAMNIOTIC TWIN PREGNANCY IN SECOND TRIMESTER: ICD-10-CM

## 2017-09-01 LAB
GLUCOSE 1H P 100 G GLC PO SERPL-MCNC: 152 MG/DL
GLUCOSE 2H P 100 G GLC PO SERPL-MCNC: 171 MG/DL
GLUCOSE 3H P 100 G GLC PO SERPL-MCNC: 132 MG/DL
GLUCOSE P FAST SERPL-MCNC: 84 MG/DL

## 2017-09-04 ENCOUNTER — RESULTS ENCOUNTER (OUTPATIENT)
Dept: OBSTETRICS AND GYNECOLOGY | Facility: CLINIC | Age: 27
End: 2017-09-04

## 2017-09-04 DIAGNOSIS — O30.042 DICHORIONIC DIAMNIOTIC TWIN PREGNANCY IN SECOND TRIMESTER: ICD-10-CM

## 2017-09-25 ENCOUNTER — ROUTINE PRENATAL (OUTPATIENT)
Dept: OBSTETRICS AND GYNECOLOGY | Facility: CLINIC | Age: 27
End: 2017-09-25

## 2017-09-25 VITALS — DIASTOLIC BLOOD PRESSURE: 74 MMHG | WEIGHT: 228 LBS | SYSTOLIC BLOOD PRESSURE: 136 MMHG | BODY MASS INDEX: 33.67 KG/M2

## 2017-09-25 DIAGNOSIS — O22.42 HEMORRHOIDS IN PREGNANCY, SECOND TRIMESTER: ICD-10-CM

## 2017-09-25 DIAGNOSIS — O09.93 ENCOUNTER FOR SUPERVISION OF HIGH RISK PREGNANCY IN THIRD TRIMESTER, ANTEPARTUM: Primary | ICD-10-CM

## 2017-09-25 DIAGNOSIS — O30.042 DICHORIONIC DIAMNIOTIC TWIN PREGNANCY IN SECOND TRIMESTER: ICD-10-CM

## 2017-09-25 DIAGNOSIS — O22.00 VARICOSE VEINS DURING PREGNANCY, ANTEPARTUM: ICD-10-CM

## 2017-09-25 PROCEDURE — 0502F SUBSEQUENT PRENATAL CARE: CPT | Performed by: OBSTETRICS & GYNECOLOGY

## 2017-09-25 NOTE — PROGRESS NOTES
Chief Complaint   Patient presents with   • Routine Prenatal Visit       HPI: Pavithra is a  currently at 30w2d who today reports the following:  Contractions - YES - but less than 4/hour AND no associated change in vaginal discharge; Leaking - No; Vaginal bleeding -  No; Heartburn - No.  Patient with good fetal movement.  Pt with irregular contractions.  Pt has been on normal activities; pt getting miserable.  ROS:   GI:   Nausea - No; Constipation - No; Diarrhea - No.   Neuro:  Headache - No; Visual disturbances - No.    EXAM:   Vitals:  See prenatal flowsheet as noted and reviewed   Abdomen:   See prenatal flowsheet as noted and reviewed   Pelvic:  See prenatal flowsheet as noted and reviewed   Urine:  See prenatal flowsheet as noted and reviewed     Lab Results   Component Value Date    ABO A 2017    RH Positive 2017    ABSCRN Negative 2017       MDM:  Impression: Supervision of high risk pregnancy  Twin pregnancy - DC/DA   Contractions with no cervical change   Tests done today: U/S for EFW - appropriate growth with 4% discordance; VTX/VTX.   Topics discussed: kick counts and fetal movement   labor signs and symptoms   Decrease hours at work and start modified bedrest   Tests next visit: none     This note was electronically signed.  Zaria Thompson M.D.

## 2017-10-02 ENCOUNTER — HOSPITAL ENCOUNTER (OUTPATIENT)
Facility: HOSPITAL | Age: 27
Setting detail: SURGERY ADMIT
End: 2017-10-02
Attending: OBSTETRICS & GYNECOLOGY | Admitting: OBSTETRICS & GYNECOLOGY

## 2017-10-02 ENCOUNTER — ROUTINE PRENATAL (OUTPATIENT)
Dept: OBSTETRICS AND GYNECOLOGY | Facility: CLINIC | Age: 27
End: 2017-10-02

## 2017-10-02 ENCOUNTER — PREP FOR SURGERY (OUTPATIENT)
Dept: OTHER | Facility: HOSPITAL | Age: 27
End: 2017-10-02

## 2017-10-02 VITALS — BODY MASS INDEX: 33.67 KG/M2 | DIASTOLIC BLOOD PRESSURE: 74 MMHG | WEIGHT: 228 LBS | SYSTOLIC BLOOD PRESSURE: 132 MMHG

## 2017-10-02 DIAGNOSIS — O22.00 VARICOSE VEINS DURING PREGNANCY, ANTEPARTUM: ICD-10-CM

## 2017-10-02 DIAGNOSIS — O22.43 HEMORRHOIDS DURING PREGNANCY IN THIRD TRIMESTER: ICD-10-CM

## 2017-10-02 DIAGNOSIS — O30.043 DICHORIONIC DIAMNIOTIC TWIN PREGNANCY IN THIRD TRIMESTER: Primary | ICD-10-CM

## 2017-10-02 DIAGNOSIS — Z98.891 HX OF CESAREAN SECTION: Primary | ICD-10-CM

## 2017-10-02 DIAGNOSIS — Z98.891 HX OF CESAREAN SECTION: ICD-10-CM

## 2017-10-02 PROCEDURE — 0502F SUBSEQUENT PRENATAL CARE: CPT | Performed by: MIDWIFE

## 2017-10-02 RX ORDER — CLINDAMYCIN PHOSPHATE 900 MG/50ML
900 INJECTION, SOLUTION INTRAVENOUS ONCE
Status: CANCELLED | OUTPATIENT
Start: 2017-10-02 | End: 2017-10-02

## 2017-10-02 RX ORDER — TRISODIUM CITRATE DIHYDRATE AND CITRIC ACID MONOHYDRATE 500; 334 MG/5ML; MG/5ML
30 SOLUTION ORAL ONCE
Status: CANCELLED | OUTPATIENT
Start: 2017-10-02 | End: 2017-10-02

## 2017-10-02 RX ORDER — METHYLERGONOVINE MALEATE 0.2 MG/ML
200 INJECTION INTRAVENOUS ONCE AS NEEDED
Status: CANCELLED | OUTPATIENT
Start: 2017-10-02

## 2017-10-02 RX ORDER — LIDOCAINE HYDROCHLORIDE 10 MG/ML
5 INJECTION, SOLUTION INFILTRATION; PERINEURAL AS NEEDED
Status: CANCELLED | OUTPATIENT
Start: 2017-10-02

## 2017-10-02 RX ORDER — ONDANSETRON 4 MG/1
4 TABLET, FILM COATED ORAL EVERY 6 HOURS PRN
Status: CANCELLED | OUTPATIENT
Start: 2017-10-02

## 2017-10-02 RX ORDER — PROMETHAZINE HYDROCHLORIDE 12.5 MG/1
12.5 SUPPOSITORY RECTAL EVERY 6 HOURS PRN
Status: CANCELLED | OUTPATIENT
Start: 2017-10-02

## 2017-10-02 RX ORDER — CARBOPROST TROMETHAMINE 250 UG/ML
250 INJECTION, SOLUTION INTRAMUSCULAR AS NEEDED
Status: CANCELLED | OUTPATIENT
Start: 2017-10-02

## 2017-10-02 RX ORDER — PROMETHAZINE HYDROCHLORIDE 12.5 MG/1
12.5 TABLET ORAL EVERY 6 HOURS PRN
Status: CANCELLED | OUTPATIENT
Start: 2017-10-02

## 2017-10-02 RX ORDER — PROMETHAZINE HYDROCHLORIDE 25 MG/ML
12.5 INJECTION, SOLUTION INTRAMUSCULAR; INTRAVENOUS EVERY 6 HOURS PRN
Status: CANCELLED | OUTPATIENT
Start: 2017-10-02

## 2017-10-02 RX ORDER — MORPHINE SULFATE 4 MG/ML
6 INJECTION, SOLUTION INTRAMUSCULAR; INTRAVENOUS
Status: CANCELLED | OUTPATIENT
Start: 2017-10-02 | End: 2017-10-12

## 2017-10-02 RX ORDER — ONDANSETRON 2 MG/ML
4 INJECTION INTRAMUSCULAR; INTRAVENOUS EVERY 6 HOURS PRN
Status: CANCELLED | OUTPATIENT
Start: 2017-10-02

## 2017-10-02 RX ORDER — ONDANSETRON 4 MG/1
4 TABLET, ORALLY DISINTEGRATING ORAL EVERY 6 HOURS PRN
Status: CANCELLED | OUTPATIENT
Start: 2017-10-02

## 2017-10-02 RX ORDER — ZOLPIDEM TARTRATE 5 MG/1
10 TABLET ORAL NIGHTLY PRN
Status: CANCELLED | OUTPATIENT
Start: 2017-10-02 | End: 2017-10-12

## 2017-10-02 RX ORDER — MISOPROSTOL 200 UG/1
800 TABLET ORAL AS NEEDED
Status: CANCELLED | OUTPATIENT
Start: 2017-10-02

## 2017-10-02 RX ORDER — SODIUM CHLORIDE 0.9 % (FLUSH) 0.9 %
1-10 SYRINGE (ML) INJECTION AS NEEDED
Status: CANCELLED | OUTPATIENT
Start: 2017-10-02

## 2017-10-16 ENCOUNTER — ROUTINE PRENATAL (OUTPATIENT)
Dept: OBSTETRICS AND GYNECOLOGY | Facility: CLINIC | Age: 27
End: 2017-10-16

## 2017-10-16 ENCOUNTER — HOSPITAL ENCOUNTER (OUTPATIENT)
Facility: HOSPITAL | Age: 27
Discharge: HOME | End: 2017-10-17
Attending: MIDWIFE | Admitting: MIDWIFE

## 2017-10-16 VITALS — BODY MASS INDEX: 34.26 KG/M2 | SYSTOLIC BLOOD PRESSURE: 136 MMHG | DIASTOLIC BLOOD PRESSURE: 84 MMHG | WEIGHT: 232 LBS

## 2017-10-16 DIAGNOSIS — Z98.891 HX OF CESAREAN SECTION: ICD-10-CM

## 2017-10-16 DIAGNOSIS — O22.00 VARICOSE VEINS DURING PREGNANCY, ANTEPARTUM: ICD-10-CM

## 2017-10-16 DIAGNOSIS — Z34.93 NORMAL PREGNANCY, THIRD TRIMESTER: Primary | ICD-10-CM

## 2017-10-16 DIAGNOSIS — O30.043 DICHORIONIC DIAMNIOTIC TWIN PREGNANCY IN THIRD TRIMESTER: ICD-10-CM

## 2017-10-16 DIAGNOSIS — O22.43 HEMORRHOIDS DURING PREGNANCY IN THIRD TRIMESTER: ICD-10-CM

## 2017-10-16 PROCEDURE — 59025 FETAL NON-STRESS TEST: CPT

## 2017-10-16 PROCEDURE — 0502F SUBSEQUENT PRENATAL CARE: CPT | Performed by: NURSE PRACTITIONER

## 2017-10-16 PROCEDURE — G0463 HOSPITAL OUTPT CLINIC VISIT: HCPCS

## 2017-10-16 RX ORDER — NIFEDIPINE 10 MG/1
10 CAPSULE ORAL
Status: DISCONTINUED | OUTPATIENT
Start: 2017-10-16 | End: 2017-10-16

## 2017-10-16 RX ORDER — NIFEDIPINE 10 MG/1
20 CAPSULE ORAL EVERY 6 HOURS SCHEDULED
Status: DISCONTINUED | OUTPATIENT
Start: 2017-10-16 | End: 2017-10-17 | Stop reason: HOSPADM

## 2017-10-16 RX ORDER — SODIUM CHLORIDE 450 MG/100ML
125 INJECTION, SOLUTION INTRAVENOUS CONTINUOUS
Status: DISCONTINUED | OUTPATIENT
Start: 2017-10-16 | End: 2017-10-17 | Stop reason: HOSPADM

## 2017-10-16 RX ADMIN — SODIUM CHLORIDE 125 ML/HR: 4.5 INJECTION, SOLUTION INTRAVENOUS at 19:54

## 2017-10-16 RX ADMIN — NIFEDIPINE 10 MG: 10 CAPSULE ORAL at 17:32

## 2017-10-16 RX ADMIN — NIFEDIPINE 20 MG: 10 CAPSULE ORAL at 22:09

## 2017-10-16 NOTE — H&P
[facility]  Pavithra Carter  : 1990  MRN: [id number]  CSN: [id number]    History and Physical    Subjective   Pavithra Carter is a 26 y.o. year old  with an Estimated Date of Delivery: 17 currently at 33w2d sent from the office irregular contractions and NST for twins.  She has noticed some sporadic cramping/tightening at home but less than 2-3 per hour. She says she is usually active during the day and doesn't pay attention to them. Babies have been active. She denies any bleeding or leakage of fluid.    She has not been recently examined.      Prenatal care has been with [facility].  It has been complicated by twin gestation.  Previous pregnancy primary  at term for preeclampsia.    History  Obstetric History       T1      L1     SAB0   TAB0   Ectopic0   Multiple0   Live Births1       # Outcome Date GA Lbr Esteban/2nd Weight Sex Delivery Anes PTL Lv   2 Current            1 Term 09 39w0d  8 lb 2 oz (3.685 kg) M CS-LTranv Spinal  CHERYL      Complications: Pre-eclampsia        Past Medical History:   Diagnosis Date   • Female infertility    • Migraine    • Multiple gestation    • PMS (premenstrual syndrome)    • Preeclampsia 2009     No current facility-administered medications on file prior to encounter.      Current Outpatient Prescriptions on File Prior to Encounter   Medication Sig Dispense Refill   • hydrocortisone (ANUSOL-HC) 2.5 % rectal cream Apply rectally 2 times daily 30 g 1   • Prenatal Vit-Fe Fumarate-FA (PRENATAL, CLASSIC, VITAMIN) 28-0.8 MG tablet tablet Take  by mouth Daily.     • promethazine (PHENERGAN) 25 MG tablet Take 0.5 tablets by mouth Every 6 (Six) Hours As Needed for Nausea or Vomiting. 20 tablet 0     No Known Allergies  Past Surgical History:   Procedure Laterality Date   •  SECTION  2009     Family History   Problem Relation Age of Onset   • Hypertension Father    • Hyperlipidemia Father    • Lung cancer Father   "  • Diabetes Father    • Hypertension Mother    • Hyperlipidemia Mother    • Diabetes Mother    • No Known Problems Sister    • No Known Problems Son    • No Known Problems Paternal Grandfather    • No Known Problems Paternal Grandmother    • Diabetes Maternal Grandmother    • Diabetes Maternal Grandfather      Social History     Social History   • Marital status:      Spouse name: N/A   • Number of children: N/A   • Years of education: N/A     Social History Main Topics   • Smoking status: Never Smoker   • Smokeless tobacco: Never Used   • Alcohol use No   • Drug use: No   • Sexual activity: Yes     Partners: Male     Birth control/ protection: None     Other Topics Concern   • None     Social History Narrative     Review of Systems    Pertinent items are noted in HPI, all other systems reviewed and negative     Objective  Vitals:    10/16/17 1555 10/16/17 1600 10/16/17 1700   BP:  128/78 134/78   BP Location: Left arm     Patient Position: Lying     Pulse: 98 110 79   Resp: 16     Temp: 98.5 °F (36.9 °C)     TempSrc: Oral     SpO2: 100%     Weight: 232 lb (105 kg)     Height: 69\" (175.3 cm)       Physical Exam:  General Appearance: alert, appears stated age and cooperative  Abdomen: soft non-tender and uterus gravid and nontender  Pelvic: External genitalia:  normal appearance of the external genitalia including Bartholin's and [place]'s glands.  Cervix:  FT/50%/ballotable, posterior  Neurologic: Mental Status orientated to person, place, time and situation  Psych: normal mood and affect, thought content organized and appropriate judgment    FHT's:  reassuring, reactive and category 1 x 2  Cervix:  was checked (by me): .5 cm / 50 % / ballotable  Presentation:  cephalic and cephalic  Contractions:  irregular - external monitors used, q 4-6 minutes    Prenatal Labs  Lab Results   Component Value Date    HGB 11.0 (L) 08/23/2017    HEPBSAG Negative 04/25/2017    ABSCRN Negative 04/25/2017    [id number] 144 " 2017       Current Labs Reviewed               No new data                  Lab Results (last 24 hours)     ** No results found for the last 24 hours. **             Assessment   1. IUP at 33w2d  2.  contractions  3. Twins  4. Previous      Plan   1. Consulted with Dr Leonardo  2. Procardia 10mg po q 4 hours, may need to increase to 20mg  3. Will hold Celestone for now    Joan Baltazar CNM  10/16/2017  5:31 PM

## 2017-10-17 VITALS
RESPIRATION RATE: 16 BRPM | HEIGHT: 69 IN | BODY MASS INDEX: 34.36 KG/M2 | DIASTOLIC BLOOD PRESSURE: 59 MMHG | TEMPERATURE: 98 F | OXYGEN SATURATION: 100 % | SYSTOLIC BLOOD PRESSURE: 132 MMHG | HEART RATE: 104 BPM | WEIGHT: 232 LBS

## 2017-10-17 LAB
BACTERIA UR QL AUTO: ABNORMAL /HPF
BILIRUB UR QL STRIP: NEGATIVE
CLARITY UR: CLEAR
COLOR UR: YELLOW
GLUCOSE UR STRIP-MCNC: NEGATIVE MG/DL
HGB UR QL STRIP.AUTO: ABNORMAL
HYALINE CASTS UR QL AUTO: ABNORMAL /LPF
KETONES UR QL STRIP: ABNORMAL
LEUKOCYTE ESTERASE UR QL STRIP.AUTO: ABNORMAL
NITRITE UR QL STRIP: NEGATIVE
PH UR STRIP.AUTO: 7 [PH] (ref 5–8)
PROT UR QL STRIP: NEGATIVE
RBC # UR: ABNORMAL /HPF
REF LAB TEST METHOD: ABNORMAL
SP GR UR STRIP: 1.01 (ref 1–1.03)
SQUAMOUS #/AREA URNS HPF: ABNORMAL /HPF
UROBILINOGEN UR QL STRIP: ABNORMAL
WBC UR QL AUTO: ABNORMAL /HPF

## 2017-10-17 PROCEDURE — 96361 HYDRATE IV INFUSION ADD-ON: CPT

## 2017-10-17 PROCEDURE — 81001 URINALYSIS AUTO W/SCOPE: CPT | Performed by: NURSE PRACTITIONER

## 2017-10-17 PROCEDURE — G0463 HOSPITAL OUTPT CLINIC VISIT: HCPCS

## 2017-10-17 PROCEDURE — 96360 HYDRATION IV INFUSION INIT: CPT

## 2017-10-17 PROCEDURE — 99214 OFFICE O/P EST MOD 30 MIN: CPT | Performed by: NURSE PRACTITIONER

## 2017-10-17 PROCEDURE — 87086 URINE CULTURE/COLONY COUNT: CPT | Performed by: NURSE PRACTITIONER

## 2017-10-17 PROCEDURE — 59025 FETAL NON-STRESS TEST: CPT

## 2017-10-17 RX ORDER — NIFEDIPINE 20 MG/1
20 CAPSULE ORAL EVERY 6 HOURS SCHEDULED
Qty: 40 CAPSULE | Refills: 0 | Status: ON HOLD | OUTPATIENT
Start: 2017-10-17 | End: 2017-10-26

## 2017-10-17 RX ADMIN — SODIUM CHLORIDE 125 ML/HR: 4.5 INJECTION, SOLUTION INTRAVENOUS at 04:17

## 2017-10-17 RX ADMIN — NIFEDIPINE 20 MG: 10 CAPSULE ORAL at 04:16

## 2017-10-17 NOTE — DISCHARGE SUMMARY
Discharge Summary     Antione Carter  : 1990  MRN: [id number]  CSN: [id number]    Date of Admission: 10/16/2017   Date of Discharge:  10/17/2017       Admission Diagnosis: 1.  IUP twins -  ctx's    Discharge Diagnosis: 1. Same as above plus  2. Pregnancy at 33w3d     Hospital Course  Patient is a 26 y.o.  who at 33w3d states pelvic pressure and low back pain has resolved after starting nifedipine last night.  States had been feeling ctx's with the pressure and back pain - now all resolved.  Denies vaginal bleeding or fluid leaking.   Did have a h\a after procardia last night but now mild h/a and desires to go home.  Denies visual disturbances - epigastric pain.  Denies s/s UTI      Min/max vitals past 24 hours:   Temp  Min: 98 °F (36.7 °C)  Max: 98.5 °F (36.9 °C)  BP  Min: 93/38  Max: 145/68  Pulse  Min: 79  Max: 120  Resp  Min: 16  Max: 18         General: well developed; well nourished  no acute distress   Heart: Not performed.   Lungs: breathing is unlabored  clear to auscultation bilaterally   Abdomen: gravid with twins - no ctx's noted per EFM and FHT's with baby A&B with variability and accels    FHT's: reassuring and category 1   Cervix: was not checked.   Contractions: see above   Back: neg. CVA tenderness   Extremities: Pretibial pitting edema 1+ bilat , + superficial varicosity,  DTR's 1+  V/E deferred for now      A.   33 3/7 wks gestation with twins   Threatened PTL - ctx's now resolved   U/A + ketonuria  Possible asymptomatic UTI with WBC's 6-12 epith 3-6 RBC's 0-2  3 hr GTT (1 abnormal)  Hx C/S   hx of preeclampsia    LE edema and varicosity   FHT's cat 1  Has been working full time       Plan:    Reg. Diet provided this am   Encouraged to begin maternity leave  D/C home with s/s PTL - to continue procardia 20 mg q 6 hrs  PIH precautions  Nutrition rev'd / discussed GTT - will do RBS in office Monday as pt desires d/c now / just finish breakfast   No excessive  activity   Continue to note good FM   Discussed u/a - will hold antibiotics until u/a C&S in  Office Mon or prn   Encouraged questions - none at this time.   Will consult and confirm with Dr. Leonardo POC - agrees               Discharge labs  Lab Results   Component Value Date    WBC 11.03 (H) 08/23/2017    HGB 11.0 (L) 08/23/2017    HCT 33.0 (L) 08/23/2017     08/23/2017       Discharge Medications   Pavithra Carter   Home Medication Instructions DOTTIE:[id number]    Printed on:10/17/17 0820   Medication Information                      hydrocortisone (ANUSOL-HC) 2.5 % rectal cream  Apply rectally 2 times daily             NIFEdipine (PROCARDIA) 20 MG capsule  Take 1 capsule by mouth Every 6 (Six) Hours for 10 days.             Prenatal Vit-Fe Fumarate-FA (PRENATAL, CLASSIC, VITAMIN) 28-0.8 MG tablet tablet  Take  by mouth Daily.             promethazine (PHENERGAN) 25 MG tablet  Take 0.5 tablets by mouth Every 6 (Six) Hours As Needed for Nausea or Vomiting.                 Discharge Disposition Home or Self CareSatisfactory   Condition on Discharge: good   Follow-up: 6 days with [facility]  10/17/2017

## 2017-10-17 NOTE — NURSING NOTE
Take home instructions reviewed with patient and patient verbalized understanding to instructions and signed.  Patient to go to her pharmacy and  new prescription and follow accordingly.  Patient to return to  if any changes and to follow up with next scheduled appointment with [facility]. - Qing Olson RN.

## 2017-10-17 NOTE — SIGNIFICANT NOTE
VENKAT ACOSTA at bedside talking with patient. Plan of care is to have IV hydration overnight and Nifedipine 20mg po every 4 hours. Intermittent fetal monitoring of FHR.

## 2017-10-17 NOTE — SIGNIFICANT NOTE
VENKAT ACOSTA at bedside talking with patient. Verbal order for intermittent FHR monitoring. Ultrasounds removed at this time.

## 2017-10-17 NOTE — PLAN OF CARE
Problem: Patient Care Overview (Adult)  Goal: Plan of Care Review  Outcome: Ongoing (interventions implemented as appropriate)  Goal: Adult Individualization and Mutuality  Outcome: Ongoing (interventions implemented as appropriate)  Goal: Discharge Needs Assessment  Outcome: Ongoing (interventions implemented as appropriate)    Problem:  Labor (Adult,Obstetrics,Pediatric)  Goal: Signs and Symptoms of Listed Potential Problems Will be Absent or Manageable ( Labor)  Outcome: Ongoing (interventions implemented as appropriate)

## 2017-10-19 LAB — BACTERIA SPEC AEROBE CULT: NORMAL

## 2017-10-23 ENCOUNTER — ROUTINE PRENATAL (OUTPATIENT)
Dept: OBSTETRICS AND GYNECOLOGY | Facility: CLINIC | Age: 27
End: 2017-10-23

## 2017-10-23 VITALS — DIASTOLIC BLOOD PRESSURE: 70 MMHG | BODY MASS INDEX: 34.85 KG/M2 | WEIGHT: 236 LBS | SYSTOLIC BLOOD PRESSURE: 128 MMHG

## 2017-10-23 DIAGNOSIS — O30.043 DICHORIONIC DIAMNIOTIC TWIN PREGNANCY IN THIRD TRIMESTER: ICD-10-CM

## 2017-10-23 DIAGNOSIS — O22.43 HEMORRHOIDS DURING PREGNANCY IN THIRD TRIMESTER: ICD-10-CM

## 2017-10-23 DIAGNOSIS — IMO0001 TWINS: Primary | ICD-10-CM

## 2017-10-23 DIAGNOSIS — Z34.93 NORMAL PREGNANCY, THIRD TRIMESTER: ICD-10-CM

## 2017-10-23 DIAGNOSIS — Z98.891 HX OF CESAREAN SECTION: ICD-10-CM

## 2017-10-23 DIAGNOSIS — O22.00 VARICOSE VEINS DURING PREGNANCY, ANTEPARTUM: ICD-10-CM

## 2017-10-23 PROCEDURE — 0502F SUBSEQUENT PRENATAL CARE: CPT | Performed by: NURSE PRACTITIONER

## 2017-10-23 NOTE — PROGRESS NOTES
Chief Complaint   Patient presents with   • Routine Prenatal Visit     irregular contractions        HPI  , 34w2d reports taking procardia only prn.  Has not taken for 2 days - causes terrible migraines.  States able to feel ctx's - only 2-3 ctx's at most in 1 hr.  No bleeding or fluid leaking .  Pt states increased edema over wkend - was out for social event     ROS  /70  Wt 236 lb (107 kg)  LMP 2017 (Approximate)  BMI 34.85 kg/m2 -See Prenatal Assessment    ROS:  GI: Nausea - No; Constipation - No; Diarrhea - No    Neuro: Headache - No; Visual change - No      EXAM  General Appearance: no distress  Lungs: Breathing unlabored  Abdomen:  See flow sheet for Fundal ht, FM, FHT's  LE: 1 + pretibial pitting edema - some varicosity - no change     MDM  Impression:  Problems/Risks: High Risk Pregnancy  Previous C/S with planned repeat C/S  Twin pregnancy - DC/DA  Previous Threatened PTL  Benign LE edema   Tests done today: U/S for growth  Vtx/vtx  91% 6#5 / 94% 6#8   2% discordance    Topics discussed: continue to note good FM  adeq rest and fluid   limited in house activity  Preventive measures of LE edema  PIH instructions   s/s PTL - to  with any s/s PTL - procardia if needed - if any doubt - to go to     To do NST on Thursday or Fri  encouraged questions - call prn    Tests next visit: none     Rev'd pt status with Dr. Leonardo - agree with above     OB History      Para Term  AB Living    2 1 1 0 0 1    SAB TAB Ectopic Multiple Live Births    0 0 0 0 1          Past Medical History:   Diagnosis Date   • Female infertility    • Migraine    • Multiple gestation    • PMS (premenstrual syndrome)    • Preeclampsia 2009       Past Surgical History:   Procedure Laterality Date   •  SECTION  2009       Family History   Problem Relation Age of Onset   • Hypertension Father    • Hyperlipidemia Father    • Lung cancer Father    • Diabetes Father    • Hypertension  Mother    • Hyperlipidemia Mother    • Diabetes Mother    • No Known Problems Sister    • No Known Problems Son    • No Known Problems Paternal Grandfather    • No Known Problems Paternal Grandmother    • Diabetes Maternal Grandmother    • Diabetes Maternal Grandfather        Social History     Social History   • Marital status:      Spouse name: N/A   • Number of children: N/A   • Years of education: N/A     Occupational History   • Not on file.     Social History Main Topics   • Smoking status: Never Smoker   • Smokeless tobacco: Never Used   • Alcohol use No   • Drug use: No   • Sexual activity: Yes     Partners: Male     Birth control/ protection: None     Other Topics Concern   • Not on file     Social History Narrative

## 2017-10-26 ENCOUNTER — HOSPITAL ENCOUNTER (OUTPATIENT)
Facility: HOSPITAL | Age: 27
Discharge: HOME | End: 2017-10-26
Attending: MIDWIFE | Admitting: MIDWIFE

## 2017-10-26 ENCOUNTER — TELEPHONE (OUTPATIENT)
Dept: OBSTETRICS AND GYNECOLOGY | Facility: CLINIC | Age: 27
End: 2017-10-26

## 2017-10-26 VITALS
WEIGHT: 232 LBS | BODY MASS INDEX: 34.36 KG/M2 | SYSTOLIC BLOOD PRESSURE: 155 MMHG | RESPIRATION RATE: 16 BRPM | HEART RATE: 93 BPM | TEMPERATURE: 98.2 F | DIASTOLIC BLOOD PRESSURE: 94 MMHG | HEIGHT: 69 IN

## 2017-10-26 LAB
ALT SERPL W P-5'-P-CCNC: 27 U/L (ref 13–69)
AST SERPL-CCNC: 18 U/L (ref 15–46)
BACTERIA UR QL AUTO: ABNORMAL /HPF
BASOPHILS # BLD AUTO: 0.02 10*3/MM3 (ref 0–0.2)
BASOPHILS NFR BLD AUTO: 0.2 % (ref 0–2.5)
BILIRUB UR QL STRIP: NEGATIVE
CLARITY UR: CLEAR
COLOR UR: YELLOW
CREAT BLD-MCNC: 0.6 MG/DL (ref 0.6–1.3)
DEPRECATED RDW RBC AUTO: 42.2 FL (ref 37–54)
EOSINOPHIL # BLD AUTO: 0.06 10*3/MM3 (ref 0–0.7)
EOSINOPHIL NFR BLD AUTO: 0.5 % (ref 0–7)
ERYTHROCYTE [DISTWIDTH] IN BLOOD BY AUTOMATED COUNT: 14.5 % (ref 11.5–14.5)
FIBRINOGEN PPP-MCNC: 804 MG/DL (ref 200–400)
GFR SERPL CREATININE-BSD FRML MDRD: 120 ML/MIN/1.73
GLUCOSE UR STRIP-MCNC: NEGATIVE MG/DL
HCT VFR BLD AUTO: 33.1 % (ref 37–47)
HGB BLD-MCNC: 11 G/DL (ref 12–16)
HGB UR QL STRIP.AUTO: NEGATIVE
HYALINE CASTS UR QL AUTO: ABNORMAL /LPF
IMM GRANULOCYTES # BLD: 0.07 10*3/MM3 (ref 0–0.06)
IMM GRANULOCYTES NFR BLD: 0.6 % (ref 0–0.6)
KETONES UR QL STRIP: NEGATIVE
LEUKOCYTE ESTERASE UR QL STRIP.AUTO: ABNORMAL
LYMPHOCYTES # BLD AUTO: 1.37 10*3/MM3 (ref 0.6–3.4)
LYMPHOCYTES NFR BLD AUTO: 12.5 % (ref 10–50)
MCH RBC QN AUTO: 27 PG (ref 27–31)
MCHC RBC AUTO-ENTMCNC: 33.2 G/DL (ref 30–37)
MCV RBC AUTO: 81.3 FL (ref 81–99)
MONOCYTES # BLD AUTO: 0.92 10*3/MM3 (ref 0–0.9)
MONOCYTES NFR BLD AUTO: 8.4 % (ref 0–12)
NEUTROPHILS # BLD AUTO: 8.53 10*3/MM3 (ref 2–6.9)
NEUTROPHILS NFR BLD AUTO: 77.8 % (ref 37–80)
NITRITE UR QL STRIP: NEGATIVE
NRBC BLD MANUAL-RTO: 0 /100 WBC (ref 0–0)
PH UR STRIP.AUTO: 7 [PH] (ref 5–8)
PLATELET # BLD AUTO: 244 10*3/MM3 (ref 130–400)
PMV BLD AUTO: 9.5 FL (ref 6–12)
PROT UR QL STRIP: ABNORMAL
RBC # BLD AUTO: 4.07 10*6/MM3 (ref 4.2–5.4)
RBC # UR: ABNORMAL /HPF
REF LAB TEST METHOD: ABNORMAL
SP GR UR STRIP: 1.01 (ref 1–1.03)
SQUAMOUS #/AREA URNS HPF: ABNORMAL /HPF
URATE SERPL-MCNC: 5.7 MG/DL (ref 2.5–8.5)
UROBILINOGEN UR QL STRIP: ABNORMAL
WBC NRBC COR # BLD: 10.97 10*3/MM3 (ref 4.8–10.8)
WBC UR QL AUTO: ABNORMAL /HPF

## 2017-10-26 PROCEDURE — 59025 FETAL NON-STRESS TEST: CPT | Performed by: NURSE PRACTITIONER

## 2017-10-26 PROCEDURE — 82565 ASSAY OF CREATININE: CPT | Performed by: NURSE PRACTITIONER

## 2017-10-26 PROCEDURE — 81001 URINALYSIS AUTO W/SCOPE: CPT | Performed by: NURSE PRACTITIONER

## 2017-10-26 PROCEDURE — 85025 COMPLETE CBC W/AUTO DIFF WBC: CPT | Performed by: NURSE PRACTITIONER

## 2017-10-26 PROCEDURE — 99214 OFFICE O/P EST MOD 30 MIN: CPT | Performed by: NURSE PRACTITIONER

## 2017-10-26 PROCEDURE — 84460 ALANINE AMINO (ALT) (SGPT): CPT | Performed by: NURSE PRACTITIONER

## 2017-10-26 PROCEDURE — 84550 ASSAY OF BLOOD/URIC ACID: CPT | Performed by: NURSE PRACTITIONER

## 2017-10-26 PROCEDURE — 84450 TRANSFERASE (AST) (SGOT): CPT | Performed by: NURSE PRACTITIONER

## 2017-10-26 PROCEDURE — 85384 FIBRINOGEN ACTIVITY: CPT | Performed by: NURSE PRACTITIONER

## 2017-10-26 PROCEDURE — G0463 HOSPITAL OUTPT CLINIC VISIT: HCPCS

## 2017-10-26 PROCEDURE — 59025 FETAL NON-STRESS TEST: CPT

## 2017-10-26 RX ORDER — NIFEDIPINE 10 MG/1
10 CAPSULE ORAL
Qty: 60 CAPSULE | Refills: 0 | Status: SHIPPED | OUTPATIENT
Start: 2017-10-26 | End: 2017-10-30 | Stop reason: SINTOL

## 2017-10-26 RX ORDER — NIFEDIPINE 10 MG/1
10 CAPSULE ORAL
Status: DISCONTINUED | OUTPATIENT
Start: 2017-10-26 | End: 2017-10-26 | Stop reason: HOSPADM

## 2017-10-26 RX ADMIN — NIFEDIPINE 10 MG: 10 CAPSULE ORAL at 15:31

## 2017-10-26 RX ADMIN — NIFEDIPINE 10 MG: 10 CAPSULE ORAL at 11:28

## 2017-10-27 PROCEDURE — 84156 ASSAY OF PROTEIN URINE: CPT | Performed by: NURSE PRACTITIONER

## 2017-10-27 PROCEDURE — 81050 URINALYSIS VOLUME MEASURE: CPT | Performed by: NURSE PRACTITIONER

## 2017-10-28 ENCOUNTER — HOSPITAL ENCOUNTER (OUTPATIENT)
Facility: HOSPITAL | Age: 27
Discharge: HOME | End: 2017-10-28
Attending: MIDWIFE | Admitting: MIDWIFE

## 2017-10-28 ENCOUNTER — LAB REQUISITION (OUTPATIENT)
Dept: LAB | Facility: HOSPITAL | Age: 27
End: 2017-10-28

## 2017-10-28 VITALS
RESPIRATION RATE: 18 BRPM | SYSTOLIC BLOOD PRESSURE: 141 MMHG | HEART RATE: 85 BPM | TEMPERATURE: 98.3 F | DIASTOLIC BLOOD PRESSURE: 86 MMHG | BODY MASS INDEX: 34.26 KG/M2 | WEIGHT: 232 LBS | OXYGEN SATURATION: 97 %

## 2017-10-28 DIAGNOSIS — O14.90 PRE-ECLAMPSIA: ICD-10-CM

## 2017-10-28 LAB
COLLECT DURATION TIME UR: 24 HRS
PROT 24H UR-MRATE: 843.2 MG/24HOURS (ref 42–225)
PROT UR-MCNC: 31 MG/DL (ref 0–11.9)
SPECIMEN VOL 24H UR: 2720 ML

## 2017-10-28 PROCEDURE — 59025 FETAL NON-STRESS TEST: CPT

## 2017-10-28 PROCEDURE — 99213 OFFICE O/P EST LOW 20 MIN: CPT | Performed by: NURSE PRACTITIONER

## 2017-10-28 PROCEDURE — 59025 FETAL NON-STRESS TEST: CPT | Performed by: NURSE PRACTITIONER

## 2017-10-30 ENCOUNTER — ROUTINE PRENATAL (OUTPATIENT)
Dept: OBSTETRICS AND GYNECOLOGY | Facility: CLINIC | Age: 27
End: 2017-10-30

## 2017-10-30 ENCOUNTER — HOSPITAL ENCOUNTER (INPATIENT)
Facility: HOSPITAL | Age: 27
LOS: 4 days | Discharge: HOME | End: 2017-11-03
Attending: MIDWIFE | Admitting: MIDWIFE

## 2017-10-30 VITALS — WEIGHT: 239 LBS | SYSTOLIC BLOOD PRESSURE: 164 MMHG | DIASTOLIC BLOOD PRESSURE: 90 MMHG | BODY MASS INDEX: 35.29 KG/M2

## 2017-10-30 DIAGNOSIS — O14.13 SEVERE PRE-ECLAMPSIA IN THIRD TRIMESTER: ICD-10-CM

## 2017-10-30 DIAGNOSIS — O30.043 DICHORIONIC DIAMNIOTIC TWIN PREGNANCY IN THIRD TRIMESTER: ICD-10-CM

## 2017-10-30 DIAGNOSIS — O22.43 HEMORRHOIDS DURING PREGNANCY IN THIRD TRIMESTER: ICD-10-CM

## 2017-10-30 DIAGNOSIS — Z98.891 HX OF CESAREAN SECTION: ICD-10-CM

## 2017-10-30 DIAGNOSIS — O30.043 DICHORIONIC DIAMNIOTIC TWIN PREGNANCY IN THIRD TRIMESTER: Primary | ICD-10-CM

## 2017-10-30 DIAGNOSIS — Z36.85 ANTENATAL SCREENING FOR STREPTOCOCCUS B: ICD-10-CM

## 2017-10-30 DIAGNOSIS — O22.00 VARICOSE VEINS DURING PREGNANCY, ANTEPARTUM: ICD-10-CM

## 2017-10-30 DIAGNOSIS — O16.3 HYPERTENSION DURING PREGNANCY IN THIRD TRIMESTER, UNSPECIFIED HYPERTENSION IN PREGNANCY TYPE: Primary | ICD-10-CM

## 2017-10-30 PROBLEM — Z34.90 PREGNANCY: Status: ACTIVE | Noted: 2017-10-30

## 2017-10-30 LAB
ABO GROUP BLD: NORMAL
ALT SERPL W P-5'-P-CCNC: 32 U/L (ref 13–69)
AST SERPL-CCNC: 19 U/L (ref 15–46)
BACTERIA UR QL AUTO: ABNORMAL /HPF
BILIRUB UR QL STRIP: NEGATIVE
BLD GP AB SCN SERPL QL: NEGATIVE
CLARITY UR: CLEAR
COLOR UR: YELLOW
DEPRECATED RDW RBC AUTO: 42.1 FL (ref 37–54)
ERYTHROCYTE [DISTWIDTH] IN BLOOD BY AUTOMATED COUNT: 14.6 % (ref 11.5–14.5)
GLUCOSE UR STRIP-MCNC: NEGATIVE MG/DL
HCT VFR BLD AUTO: 34.5 % (ref 37–47)
HGB BLD-MCNC: 11.5 G/DL (ref 12–16)
HGB UR QL STRIP.AUTO: NEGATIVE
HYALINE CASTS UR QL AUTO: ABNORMAL /LPF
KETONES UR QL STRIP: ABNORMAL
LEUKOCYTE ESTERASE UR QL STRIP.AUTO: ABNORMAL
MCH RBC QN AUTO: 27.1 PG (ref 27–31)
MCHC RBC AUTO-ENTMCNC: 33.3 G/DL (ref 30–37)
MCV RBC AUTO: 81.2 FL (ref 81–99)
MUCOUS THREADS URNS QL MICRO: ABNORMAL /HPF
NITRITE UR QL STRIP: NEGATIVE
PH UR STRIP.AUTO: 7 [PH] (ref 5–8)
PLATELET # BLD AUTO: 250 10*3/MM3 (ref 130–400)
PMV BLD AUTO: 9.4 FL (ref 6–12)
PROT UR QL STRIP: ABNORMAL
RBC # BLD AUTO: 4.25 10*6/MM3 (ref 4.2–5.4)
RBC # UR: ABNORMAL /HPF
REF LAB TEST METHOD: ABNORMAL
RH BLD: POSITIVE
SP GR UR STRIP: 1.01 (ref 1–1.03)
SQUAMOUS #/AREA URNS HPF: ABNORMAL /HPF
TRANS CELLS #/AREA URNS HPF: ABNORMAL /HPF
URATE SERPL-MCNC: 5.9 MG/DL (ref 2.5–8.5)
UROBILINOGEN UR QL STRIP: ABNORMAL
WBC NRBC COR # BLD: 13.99 10*3/MM3 (ref 4.8–10.8)
WBC UR QL AUTO: ABNORMAL /HPF

## 2017-10-30 PROCEDURE — 84460 ALANINE AMINO (ALT) (SGPT): CPT | Performed by: MIDWIFE

## 2017-10-30 PROCEDURE — 84550 ASSAY OF BLOOD/URIC ACID: CPT | Performed by: MIDWIFE

## 2017-10-30 PROCEDURE — 87086 URINE CULTURE/COLONY COUNT: CPT | Performed by: MIDWIFE

## 2017-10-30 PROCEDURE — 59025 FETAL NON-STRESS TEST: CPT

## 2017-10-30 PROCEDURE — G0463 HOSPITAL OUTPT CLINIC VISIT: HCPCS

## 2017-10-30 PROCEDURE — 81001 URINALYSIS AUTO W/SCOPE: CPT | Performed by: MIDWIFE

## 2017-10-30 PROCEDURE — 0502F SUBSEQUENT PRENATAL CARE: CPT | Performed by: MIDWIFE

## 2017-10-30 PROCEDURE — 85027 COMPLETE CBC AUTOMATED: CPT | Performed by: MIDWIFE

## 2017-10-30 PROCEDURE — 86850 RBC ANTIBODY SCREEN: CPT | Performed by: MIDWIFE

## 2017-10-30 PROCEDURE — 25010000002 MAGNESIUM SULFATE 2 GM/50ML SOLUTION: Performed by: MIDWIFE

## 2017-10-30 PROCEDURE — 84450 TRANSFERASE (AST) (SGOT): CPT | Performed by: MIDWIFE

## 2017-10-30 PROCEDURE — 86901 BLOOD TYPING SEROLOGIC RH(D): CPT | Performed by: MIDWIFE

## 2017-10-30 PROCEDURE — 86900 BLOOD TYPING SEROLOGIC ABO: CPT | Performed by: MIDWIFE

## 2017-10-30 RX ORDER — MAGNESIUM SULFATE HEPTAHYDRATE 40 MG/ML
4 INJECTION, SOLUTION INTRAVENOUS ONCE
Status: COMPLETED | OUTPATIENT
Start: 2017-10-30 | End: 2017-10-30

## 2017-10-30 RX ORDER — MAGNESIUM SULFATE HEPTAHYDRATE 40 MG/ML
2 INJECTION, SOLUTION INTRAVENOUS
Status: DISCONTINUED | OUTPATIENT
Start: 2017-10-30 | End: 2017-10-31

## 2017-10-30 RX ORDER — MAGNESIUM SULFATE HEPTAHYDRATE 40 MG/ML
INJECTION, SOLUTION INTRAVENOUS
Status: COMPLETED
Start: 2017-10-30 | End: 2017-10-30

## 2017-10-30 RX ORDER — CLINDAMYCIN PHOSPHATE 900 MG/50ML
900 INJECTION, SOLUTION INTRAVENOUS ONCE
Status: DISCONTINUED | OUTPATIENT
Start: 2017-10-30 | End: 2017-10-30 | Stop reason: ALTCHOICE

## 2017-10-30 RX ORDER — TRISODIUM CITRATE DIHYDRATE AND CITRIC ACID MONOHYDRATE 500; 334 MG/5ML; MG/5ML
30 SOLUTION ORAL ONCE
Status: COMPLETED | OUTPATIENT
Start: 2017-10-30 | End: 2017-10-31

## 2017-10-30 RX ORDER — LIDOCAINE HYDROCHLORIDE 10 MG/ML
5 INJECTION, SOLUTION INFILTRATION; PERINEURAL AS NEEDED
Status: DISCONTINUED | OUTPATIENT
Start: 2017-10-30 | End: 2017-10-31 | Stop reason: HOSPADM

## 2017-10-30 RX ORDER — SODIUM CHLORIDE 0.9 % (FLUSH) 0.9 %
1-10 SYRINGE (ML) INJECTION AS NEEDED
Status: DISCONTINUED | OUTPATIENT
Start: 2017-10-30 | End: 2017-10-31 | Stop reason: HOSPADM

## 2017-10-30 RX ORDER — MAGNESIUM SULFATE HEPTAHYDRATE 40 MG/ML
4 INJECTION, SOLUTION INTRAVENOUS
Status: COMPLETED | OUTPATIENT
Start: 2017-10-30 | End: 2017-10-31

## 2017-10-30 RX ORDER — SODIUM CHLORIDE, SODIUM LACTATE, POTASSIUM CHLORIDE, CALCIUM CHLORIDE 600; 310; 30; 20 MG/100ML; MG/100ML; MG/100ML; MG/100ML
125 INJECTION, SOLUTION INTRAVENOUS CONTINUOUS
Status: DISCONTINUED | OUTPATIENT
Start: 2017-10-30 | End: 2017-10-31 | Stop reason: HOSPADM

## 2017-10-30 RX ADMIN — SODIUM CHLORIDE, POTASSIUM CHLORIDE, SODIUM LACTATE AND CALCIUM CHLORIDE 125 ML/HR: 600; 310; 30; 20 INJECTION, SOLUTION INTRAVENOUS at 19:55

## 2017-10-30 RX ADMIN — MAGNESIUM SULFATE HEPTAHYDRATE 4 G: 40 INJECTION, SOLUTION INTRAVENOUS at 22:49

## 2017-10-30 RX ADMIN — MAGNESIUM SULFATE HEPTAHYDRATE 4 G: 40 INJECTION, SOLUTION INTRAVENOUS at 21:20

## 2017-10-30 RX ADMIN — MAGNESIUM SULFATE HEPTAHYDRATE 2 G: 40 INJECTION, SOLUTION INTRAVENOUS at 21:54

## 2017-10-30 NOTE — PROGRESS NOTES
Chief Complaint   Patient presents with   • Routine Prenatal Visit     COMPLAINS OF HEADACHE, WAS SEEN ON LABOR DOWELL SATURDAY. LARGE KETONES IN URINE DIP IN OFFICE TODAY.        HPI: Pavithra is a  currently at 35w2d who today reports the following: babies are active. She has had a headache off and on for several days.  Contractions - No; Leaking - No; Vaginal bleeding -  No; Swelling of extremities - mild.    ROS:   GI:   Nausea - No; Constipation - No   Neuro:  Headache - YES; Visual disturbances - No.    EXAM:   Vitals:  See prenatal flowsheet, /90, Wt +3#   Abdomen:   See prenatal flowsheet, soft, nontender   Pelvic:  See prenatal flowsheet   Urine:  See prenatal flowsheet    MDM:  Impression: Supervision of high risk pregnancy  Previous C/S with planned repeat C/S  Twin pregnancy - DC/DA   Tests done today: none   Topics discussed: kick counts and fetal movement  PIH precautions   Tests next visit: none   Next visit: To  for serial BPS, PIH labs, NST     This note was electronically signed.  KARLA Doshi  10/30/2017

## 2017-10-31 ENCOUNTER — ANESTHESIA EVENT (OUTPATIENT)
Dept: PERIOP | Facility: HOSPITAL | Age: 27
End: 2017-10-31

## 2017-10-31 ENCOUNTER — ANESTHESIA (OUTPATIENT)
Dept: PERIOP | Facility: HOSPITAL | Age: 27
End: 2017-10-31

## 2017-10-31 PROBLEM — O30.043 DICHORIONIC DIAMNIOTIC TWIN PREGNANCY IN THIRD TRIMESTER: Status: ACTIVE | Noted: 2017-10-28

## 2017-10-31 PROBLEM — Z98.891 S/P REPEAT LOW TRANSVERSE C-SECTION: Status: ACTIVE | Noted: 2017-10-31

## 2017-10-31 PROBLEM — Z34.90 PREGNANCY: Status: RESOLVED | Noted: 2017-10-30 | Resolved: 2017-10-31

## 2017-10-31 PROCEDURE — 25010000002 FENTANYL CITRATE (PF) 100 MCG/2ML SOLUTION: Performed by: NURSE ANESTHETIST, CERTIFIED REGISTERED

## 2017-10-31 PROCEDURE — 25010000002 MAGNESIUM SULFATE 2 GM/50ML SOLUTION: Performed by: OBSTETRICS & GYNECOLOGY

## 2017-10-31 PROCEDURE — 25010000002 ONDANSETRON PER 1 MG: Performed by: NURSE ANESTHETIST, CERTIFIED REGISTERED

## 2017-10-31 PROCEDURE — 25010000002 PHENYLEPHRINE PER 1 ML: Performed by: NURSE ANESTHETIST, CERTIFIED REGISTERED

## 2017-10-31 PROCEDURE — 25010000002 DIPHENHYDRAMINE PER 50 MG: Performed by: NURSE PRACTITIONER

## 2017-10-31 PROCEDURE — 25010000002 MAGNESIUM SULFATE 2 GM/50ML SOLUTION: Performed by: NURSE PRACTITIONER

## 2017-10-31 PROCEDURE — 25010000002 MORPHINE PER 10 MG: Performed by: NURSE ANESTHETIST, CERTIFIED REGISTERED

## 2017-10-31 PROCEDURE — 25010000002 MORPHINE PER 10 MG: Performed by: OBSTETRICS & GYNECOLOGY

## 2017-10-31 PROCEDURE — 59510 CESAREAN DELIVERY: CPT | Performed by: OBSTETRICS & GYNECOLOGY

## 2017-10-31 RX ORDER — PROMETHAZINE HYDROCHLORIDE 12.5 MG/1
12.5 TABLET ORAL EVERY 6 HOURS PRN
Status: DISCONTINUED | OUTPATIENT
Start: 2017-10-31 | End: 2017-10-31 | Stop reason: HOSPADM

## 2017-10-31 RX ORDER — CARBOPROST TROMETHAMINE 250 UG/ML
INJECTION, SOLUTION INTRAMUSCULAR AS NEEDED
Status: DISCONTINUED | OUTPATIENT
Start: 2017-10-31 | End: 2017-10-31 | Stop reason: SURG

## 2017-10-31 RX ORDER — NALOXONE HCL 0.4 MG/ML
0.4 VIAL (ML) INJECTION
Status: CANCELLED | OUTPATIENT
Start: 2017-10-31 | End: 2017-11-01

## 2017-10-31 RX ORDER — PROMETHAZINE HYDROCHLORIDE 12.5 MG/1
12.5 SUPPOSITORY RECTAL EVERY 6 HOURS PRN
Status: DISCONTINUED | OUTPATIENT
Start: 2017-10-31 | End: 2017-11-03 | Stop reason: HOSPADM

## 2017-10-31 RX ORDER — ZOLPIDEM TARTRATE 5 MG/1
10 TABLET ORAL NIGHTLY PRN
Status: DISCONTINUED | OUTPATIENT
Start: 2017-10-31 | End: 2017-10-31 | Stop reason: SDUPTHER

## 2017-10-31 RX ORDER — ONDANSETRON 4 MG/1
4 TABLET, ORALLY DISINTEGRATING ORAL EVERY 6 HOURS PRN
Status: DISCONTINUED | OUTPATIENT
Start: 2017-10-31 | End: 2017-10-31 | Stop reason: HOSPADM

## 2017-10-31 RX ORDER — MAGNESIUM SULFATE HEPTAHYDRATE 40 MG/ML
INJECTION, SOLUTION INTRAVENOUS
Status: COMPLETED
Start: 2017-10-31 | End: 2017-10-31

## 2017-10-31 RX ORDER — CARBOPROST TROMETHAMINE 250 UG/ML
250 INJECTION, SOLUTION INTRAMUSCULAR AS NEEDED
Status: DISCONTINUED | OUTPATIENT
Start: 2017-10-31 | End: 2017-10-31 | Stop reason: HOSPADM

## 2017-10-31 RX ORDER — PROMETHAZINE HYDROCHLORIDE 25 MG/ML
12.5 INJECTION, SOLUTION INTRAMUSCULAR; INTRAVENOUS EVERY 6 HOURS PRN
Status: DISCONTINUED | OUTPATIENT
Start: 2017-10-31 | End: 2017-10-31 | Stop reason: HOSPADM

## 2017-10-31 RX ORDER — MISOPROSTOL 200 UG/1
800 TABLET ORAL AS NEEDED
Status: DISCONTINUED | OUTPATIENT
Start: 2017-10-31 | End: 2017-10-31 | Stop reason: HOSPADM

## 2017-10-31 RX ORDER — DIPHENHYDRAMINE HCL 25 MG
25 CAPSULE ORAL EVERY 4 HOURS PRN
Status: DISCONTINUED | OUTPATIENT
Start: 2017-10-31 | End: 2017-11-03 | Stop reason: HOSPADM

## 2017-10-31 RX ORDER — METHYLERGONOVINE MALEATE 0.2 MG/ML
200 INJECTION INTRAVENOUS ONCE AS NEEDED
Status: DISCONTINUED | OUTPATIENT
Start: 2017-10-31 | End: 2017-10-31 | Stop reason: HOSPADM

## 2017-10-31 RX ORDER — MAGNESIUM SULFATE HEPTAHYDRATE 40 MG/ML
INJECTION, SOLUTION INTRAVENOUS
Status: DISPENSED
Start: 2017-10-31 | End: 2017-10-31

## 2017-10-31 RX ORDER — ONDANSETRON 4 MG/1
4 TABLET, FILM COATED ORAL EVERY 8 HOURS PRN
Status: DISCONTINUED | OUTPATIENT
Start: 2017-10-31 | End: 2017-11-03 | Stop reason: HOSPADM

## 2017-10-31 RX ORDER — ZOLPIDEM TARTRATE 5 MG/1
10 TABLET ORAL NIGHTLY PRN
Status: DISCONTINUED | OUTPATIENT
Start: 2017-10-31 | End: 2017-10-31 | Stop reason: HOSPADM

## 2017-10-31 RX ORDER — PROMETHAZINE HYDROCHLORIDE 25 MG/1
25 TABLET ORAL EVERY 6 HOURS PRN
Status: DISCONTINUED | OUTPATIENT
Start: 2017-10-31 | End: 2017-11-03 | Stop reason: HOSPADM

## 2017-10-31 RX ORDER — NALOXONE HCL 0.4 MG/ML
0.4 VIAL (ML) INJECTION
Status: DISCONTINUED | OUTPATIENT
Start: 2017-10-31 | End: 2017-11-03 | Stop reason: HOSPADM

## 2017-10-31 RX ORDER — ZOLPIDEM TARTRATE 5 MG/1
10 TABLET ORAL NIGHTLY PRN
Status: DISCONTINUED | OUTPATIENT
Start: 2017-10-31 | End: 2017-11-03 | Stop reason: HOSPADM

## 2017-10-31 RX ORDER — CARBOPROST TROMETHAMINE 250 UG/ML
INJECTION, SOLUTION INTRAMUSCULAR
Status: DISPENSED
Start: 2017-10-31 | End: 2017-10-31

## 2017-10-31 RX ORDER — MISOPROSTOL 200 UG/1
TABLET ORAL
Status: COMPLETED
Start: 2017-10-31 | End: 2017-10-31

## 2017-10-31 RX ORDER — SIMETHICONE 80 MG
80 TABLET,CHEWABLE ORAL 4 TIMES DAILY PRN
Status: DISCONTINUED | OUTPATIENT
Start: 2017-10-31 | End: 2017-11-03 | Stop reason: HOSPADM

## 2017-10-31 RX ORDER — PROMETHAZINE HYDROCHLORIDE 25 MG/ML
12.5 INJECTION, SOLUTION INTRAMUSCULAR; INTRAVENOUS EVERY 6 HOURS PRN
Status: DISCONTINUED | OUTPATIENT
Start: 2017-10-31 | End: 2017-11-03 | Stop reason: HOSPADM

## 2017-10-31 RX ORDER — ONDANSETRON 2 MG/ML
INJECTION INTRAMUSCULAR; INTRAVENOUS AS NEEDED
Status: DISCONTINUED | OUTPATIENT
Start: 2017-10-31 | End: 2017-10-31 | Stop reason: SURG

## 2017-10-31 RX ORDER — CARBOPROST TROMETHAMINE 250 UG/ML
INJECTION, SOLUTION INTRAMUSCULAR
Status: COMPLETED
Start: 2017-10-31 | End: 2017-10-31

## 2017-10-31 RX ORDER — ONDANSETRON 2 MG/ML
4 INJECTION INTRAMUSCULAR; INTRAVENOUS ONCE AS NEEDED
Status: CANCELLED | OUTPATIENT
Start: 2017-10-31 | End: 2017-11-01

## 2017-10-31 RX ORDER — ONDANSETRON 4 MG/1
4 TABLET, FILM COATED ORAL EVERY 6 HOURS PRN
Status: DISCONTINUED | OUTPATIENT
Start: 2017-10-31 | End: 2017-10-31 | Stop reason: HOSPADM

## 2017-10-31 RX ORDER — DOCUSATE SODIUM 100 MG/1
100 CAPSULE, LIQUID FILLED ORAL 2 TIMES DAILY PRN
Status: DISCONTINUED | OUTPATIENT
Start: 2017-10-31 | End: 2017-11-03 | Stop reason: HOSPADM

## 2017-10-31 RX ORDER — MORPHINE SULFATE 2 MG/ML
6 INJECTION, SOLUTION INTRAMUSCULAR; INTRAVENOUS
Status: DISCONTINUED | OUTPATIENT
Start: 2017-10-31 | End: 2017-10-31 | Stop reason: HOSPADM

## 2017-10-31 RX ORDER — DIPHENHYDRAMINE HYDROCHLORIDE 50 MG/ML
25 INJECTION INTRAMUSCULAR; INTRAVENOUS EVERY 6 HOURS PRN
Status: DISCONTINUED | OUTPATIENT
Start: 2017-10-31 | End: 2017-11-01

## 2017-10-31 RX ORDER — MORPHINE SULFATE 2 MG/ML
1 INJECTION, SOLUTION INTRAMUSCULAR; INTRAVENOUS EVERY 4 HOURS PRN
Status: DISCONTINUED | OUTPATIENT
Start: 2017-10-31 | End: 2017-11-03 | Stop reason: HOSPADM

## 2017-10-31 RX ORDER — PROMETHAZINE HYDROCHLORIDE 25 MG/1
12.5 SUPPOSITORY RECTAL EVERY 6 HOURS PRN
Status: DISCONTINUED | OUTPATIENT
Start: 2017-10-31 | End: 2017-10-31 | Stop reason: HOSPADM

## 2017-10-31 RX ORDER — MAGNESIUM SULFATE HEPTAHYDRATE 40 MG/ML
2 INJECTION, SOLUTION INTRAVENOUS
Status: DISPENSED | OUTPATIENT
Start: 2017-10-31 | End: 2017-11-01

## 2017-10-31 RX ORDER — MAGNESIUM SULFATE HEPTAHYDRATE 40 MG/ML
INJECTION, SOLUTION INTRAVENOUS
Status: DISPENSED
Start: 2017-10-31 | End: 2017-11-01

## 2017-10-31 RX ORDER — NALBUPHINE HCL 10 MG/ML
2.5 AMPUL (ML) INJECTION EVERY 4 HOURS PRN
Status: CANCELLED | OUTPATIENT
Start: 2017-10-31 | End: 2017-11-01

## 2017-10-31 RX ORDER — OXYTOCIN 10 [USP'U]/ML
INJECTION, SOLUTION INTRAMUSCULAR; INTRAVENOUS AS NEEDED
Status: DISCONTINUED | OUTPATIENT
Start: 2017-10-31 | End: 2017-10-31 | Stop reason: SURG

## 2017-10-31 RX ORDER — ONDANSETRON 2 MG/ML
4 INJECTION INTRAMUSCULAR; INTRAVENOUS EVERY 6 HOURS PRN
Status: DISCONTINUED | OUTPATIENT
Start: 2017-10-31 | End: 2017-10-31 | Stop reason: HOSPADM

## 2017-10-31 RX ORDER — BUPIVACAINE HYDROCHLORIDE 7.5 MG/ML
INJECTION, SOLUTION INTRASPINAL AS NEEDED
Status: DISCONTINUED | OUTPATIENT
Start: 2017-10-31 | End: 2017-10-31 | Stop reason: SURG

## 2017-10-31 RX ORDER — MORPHINE SULFATE 1 MG/ML
INJECTION, SOLUTION EPIDURAL; INTRATHECAL; INTRAVENOUS AS NEEDED
Status: DISCONTINUED | OUTPATIENT
Start: 2017-10-31 | End: 2017-10-31 | Stop reason: SURG

## 2017-10-31 RX ORDER — SODIUM CHLORIDE, SODIUM LACTATE, POTASSIUM CHLORIDE, CALCIUM CHLORIDE 600; 310; 30; 20 MG/100ML; MG/100ML; MG/100ML; MG/100ML
125 INJECTION, SOLUTION INTRAVENOUS CONTINUOUS
Status: DISCONTINUED | OUTPATIENT
Start: 2017-10-31 | End: 2017-11-01

## 2017-10-31 RX ORDER — BISACODYL 10 MG
10 SUPPOSITORY, RECTAL RECTAL DAILY PRN
Status: DISCONTINUED | OUTPATIENT
Start: 2017-10-31 | End: 2017-11-03 | Stop reason: HOSPADM

## 2017-10-31 RX ORDER — OXYCODONE HYDROCHLORIDE AND ACETAMINOPHEN 5; 325 MG/1; MG/1
2 TABLET ORAL EVERY 4 HOURS PRN
Status: DISCONTINUED | OUTPATIENT
Start: 2017-11-01 | End: 2017-11-03 | Stop reason: HOSPADM

## 2017-10-31 RX ORDER — NALBUPHINE HCL 10 MG/ML
2 AMPUL (ML) INJECTION
Status: DISCONTINUED | OUTPATIENT
Start: 2017-10-31 | End: 2017-10-31 | Stop reason: HOSPADM

## 2017-10-31 RX ORDER — OXYTOCIN IN DEXTROSE 5 % IN LR 20/1000 ML
1000 PLASTIC BAG, INJECTION (ML) INTRAVENOUS CONTINUOUS
Status: DISPENSED | OUTPATIENT
Start: 2017-10-31 | End: 2017-10-31

## 2017-10-31 RX ORDER — BISACODYL 5 MG/1
10 TABLET, DELAYED RELEASE ORAL DAILY PRN
Status: DISCONTINUED | OUTPATIENT
Start: 2017-10-31 | End: 2017-11-03 | Stop reason: HOSPADM

## 2017-10-31 RX ORDER — ONDANSETRON 2 MG/ML
4 INJECTION INTRAMUSCULAR; INTRAVENOUS EVERY 6 HOURS PRN
Status: DISCONTINUED | OUTPATIENT
Start: 2017-10-31 | End: 2017-11-03 | Stop reason: HOSPADM

## 2017-10-31 RX ORDER — MORPHINE SULFATE 2 MG/ML
6 INJECTION, SOLUTION INTRAMUSCULAR; INTRAVENOUS EVERY 4 HOURS PRN
Status: DISCONTINUED | OUTPATIENT
Start: 2017-10-31 | End: 2017-11-03 | Stop reason: HOSPADM

## 2017-10-31 RX ORDER — MAGNESIUM SULFATE HEPTAHYDRATE 40 MG/ML
2 INJECTION, SOLUTION INTRAVENOUS CONTINUOUS
Status: DISCONTINUED | OUTPATIENT
Start: 2017-10-31 | End: 2017-10-31

## 2017-10-31 RX ORDER — IBUPROFEN 600 MG/1
600 TABLET ORAL EVERY 6 HOURS PRN
Status: DISCONTINUED | OUTPATIENT
Start: 2017-11-01 | End: 2017-11-03 | Stop reason: HOSPADM

## 2017-10-31 RX ORDER — FENTANYL CITRATE 50 UG/ML
INJECTION, SOLUTION INTRAMUSCULAR; INTRAVENOUS AS NEEDED
Status: DISCONTINUED | OUTPATIENT
Start: 2017-10-31 | End: 2017-10-31 | Stop reason: SURG

## 2017-10-31 RX ORDER — DIPHENHYDRAMINE HYDROCHLORIDE 50 MG/ML
25 INJECTION INTRAMUSCULAR; INTRAVENOUS EVERY 6 HOURS PRN
Status: DISCONTINUED | OUTPATIENT
Start: 2017-10-31 | End: 2017-10-31 | Stop reason: SDUPTHER

## 2017-10-31 RX ORDER — NALOXONE HYDROCHLORIDE 0.4 MG/ML
0.08 INJECTION, SOLUTION INTRAMUSCULAR; INTRAVENOUS; SUBCUTANEOUS ONCE
Status: DISCONTINUED | OUTPATIENT
Start: 2017-10-31 | End: 2017-10-31 | Stop reason: HOSPADM

## 2017-10-31 RX ADMIN — FAMOTIDINE 20 MG: 10 INJECTION, SOLUTION INTRAVENOUS at 07:09

## 2017-10-31 RX ADMIN — BUPIVACAINE HYDROCHLORIDE IN DEXTROSE 1.4 ML: 7.5 INJECTION, SOLUTION SUBARACHNOID at 07:48

## 2017-10-31 RX ADMIN — SODIUM CHLORIDE, POTASSIUM CHLORIDE, SODIUM LACTATE AND CALCIUM CHLORIDE 125 ML/HR: 600; 310; 30; 20 INJECTION, SOLUTION INTRAVENOUS at 19:16

## 2017-10-31 RX ADMIN — MISOPROSTOL 800 MCG: 200 TABLET ORAL at 09:39

## 2017-10-31 RX ADMIN — FENTANYL CITRATE 15 MCG: 50 INJECTION, SOLUTION INTRAMUSCULAR; INTRAVENOUS at 07:48

## 2017-10-31 RX ADMIN — MORPHINE SULFATE 6 MG: 2 INJECTION, SOLUTION INTRAMUSCULAR; INTRAVENOUS at 11:22

## 2017-10-31 RX ADMIN — SODIUM CHLORIDE, POTASSIUM CHLORIDE, SODIUM LACTATE AND CALCIUM CHLORIDE: 600; 310; 30; 20 INJECTION, SOLUTION INTRAVENOUS at 08:00

## 2017-10-31 RX ADMIN — CARBOPROST TROMETHAMINE 250 MCG: 250 INJECTION, SOLUTION INTRAMUSCULAR at 09:36

## 2017-10-31 RX ADMIN — SODIUM CHLORIDE, POTASSIUM CHLORIDE, SODIUM LACTATE AND CALCIUM CHLORIDE 125 ML/HR: 600; 310; 30; 20 INJECTION, SOLUTION INTRAVENOUS at 06:33

## 2017-10-31 RX ADMIN — PHENYLEPHRINE HYDROCHLORIDE 200 MCG: 10 INJECTION INTRAVENOUS at 08:00

## 2017-10-31 RX ADMIN — MORPHINE SULFATE 6 MG: 2 INJECTION, SOLUTION INTRAMUSCULAR; INTRAVENOUS at 16:20

## 2017-10-31 RX ADMIN — SODIUM CITRATE AND CITRIC ACID MONOHYDRATE 30 ML: 500; 334 SOLUTION ORAL at 07:08

## 2017-10-31 RX ADMIN — MAGNESIUM SULFATE HEPTAHYDRATE 4 G: 40 INJECTION, SOLUTION INTRAVENOUS at 11:21

## 2017-10-31 RX ADMIN — EPHEDRINE SULFATE 50 MG: 50 INJECTION INTRAMUSCULAR; INTRAVENOUS; SUBCUTANEOUS at 07:51

## 2017-10-31 RX ADMIN — CARBOPROST TROMETHAMINE 250 MCG: 250 INJECTION, SOLUTION INTRAMUSCULAR at 08:00

## 2017-10-31 RX ADMIN — MAGNESIUM SULFATE HEPTAHYDRATE 4 G: 40 INJECTION, SOLUTION INTRAVENOUS at 13:34

## 2017-10-31 RX ADMIN — ONDANSETRON 4 MG: 2 INJECTION INTRAMUSCULAR; INTRAVENOUS at 07:49

## 2017-10-31 RX ADMIN — MORPHINE SULFATE 6 MG: 2 INJECTION, SOLUTION INTRAMUSCULAR; INTRAVENOUS at 21:24

## 2017-10-31 RX ADMIN — DIPHENHYDRAMINE HYDROCHLORIDE 25 MG: 50 INJECTION, SOLUTION INTRAMUSCULAR; INTRAVENOUS at 12:48

## 2017-10-31 RX ADMIN — MAGNESIUM SULFATE HEPTAHYDRATE 2 G/HR: 40 INJECTION, SOLUTION INTRAVENOUS at 17:58

## 2017-10-31 RX ADMIN — SODIUM CHLORIDE, POTASSIUM CHLORIDE, SODIUM LACTATE AND CALCIUM CHLORIDE 1000 ML: 600; 310; 30; 20 INJECTION, SOLUTION INTRAVENOUS at 07:00

## 2017-10-31 RX ADMIN — SODIUM CHLORIDE, POTASSIUM CHLORIDE, SODIUM LACTATE AND CALCIUM CHLORIDE: 600; 310; 30; 20 INJECTION, SOLUTION INTRAVENOUS at 08:41

## 2017-10-31 RX ADMIN — MAGNESIUM SULFATE HEPTAHYDRATE 2 G: 40 INJECTION, SOLUTION INTRAVENOUS at 09:10

## 2017-10-31 RX ADMIN — MORPHINE SULFATE 0.2 MG: 1 INJECTION EPIDURAL; INTRATHECAL; INTRAVENOUS at 07:48

## 2017-10-31 RX ADMIN — MAGNESIUM SULFATE HEPTAHYDRATE 4 G: 40 INJECTION, SOLUTION INTRAVENOUS at 04:49

## 2017-10-31 RX ADMIN — MAGNESIUM SULFATE HEPTAHYDRATE 4 G: 40 INJECTION, SOLUTION INTRAVENOUS at 06:57

## 2017-10-31 RX ADMIN — OXYTOCIN 20 UNITS: 10 INJECTION INTRAVENOUS at 08:02

## 2017-10-31 RX ADMIN — MAGNESIUM SULFATE HEPTAHYDRATE 4 G: 40 INJECTION, SOLUTION INTRAVENOUS at 00:46

## 2017-10-31 RX ADMIN — SODIUM CHLORIDE, POTASSIUM CHLORIDE, SODIUM LACTATE AND CALCIUM CHLORIDE: 600; 310; 30; 20 INJECTION, SOLUTION INTRAVENOUS at 07:37

## 2017-10-31 RX ADMIN — MAGNESIUM SULFATE HEPTAHYDRATE 4 G: 40 INJECTION, SOLUTION INTRAVENOUS at 02:44

## 2017-10-31 RX ADMIN — OXYTOCIN 20 UNITS: 10 INJECTION INTRAVENOUS at 08:40

## 2017-10-31 NOTE — ANESTHESIA PROCEDURE NOTES
Spinal Block    Patient location during procedure: OB  Indication:at surgeon's request and procedure for pain  Performed By  CRNA: BEBETO DICKENS  Preanesthetic Checklist  Completed: patient identified, site marked, surgical consent, pre-op evaluation, timeout performed, IV checked, risks and benefits discussed and monitors and equipment checked  Spinal Block Prep:  Patient Position:sitting  Sterile Tech:cap, gloves, gown, mask and sterile barriers  Prep:Chloraprep  Patient Monitoring:blood pressure monitoring and continuous pulse oximetry  Spinal Block Procedure  Approach:midline  Guidance:palpation technique  Location:L3-L4  Needle Type:Pencan  Needle Gauge:25 G  Placement of Spinal needle event:cerebrospinal fluid aspirated    Fluid Appearance:clear  Post Assessment  Patient Tolerance:patient tolerated the procedure well with no apparent complications  Complications no

## 2017-10-31 NOTE — ANESTHESIA PREPROCEDURE EVALUATION
Anesthesia Evaluation     Patient summary reviewed and Nursing notes reviewed   no history of anesthetic complications:  NPO Solid Status: > 8 hours  NPO Liquid Status: > 8 hours     Airway   Mallampati: I  TM distance: >3 FB  Neck ROM: full  no difficulty expected  Dental - normal exam     Pulmonary - negative pulmonary ROS and normal exam   Cardiovascular - normal exam    (+) hypertension,       Neuro/Psych- negative ROS  GI/Hepatic/Renal/Endo - negative ROS     Musculoskeletal (-) negative ROS    Abdominal    Substance History - negative use     OB/GYN    (+) Pregnant, Preeclampsia, pregnancy induced hypertension        Other - negative ROS                                       Anesthesia Plan    ASA 3     spinal and general     intravenous induction   Anesthetic plan and risks discussed with patient.

## 2017-10-31 NOTE — ANESTHESIA POSTPROCEDURE EVALUATION
Patient: Pavithra Carter    Procedure Summary     Date Anesthesia Start Anesthesia Stop Room / Location    10/31/17 0737   [facility]       Procedure Diagnosis Surgeon Provider     SECTION REPEAT, twins (N/A Abdomen) Dichorionic diamniotic twin pregnancy in third trimester; Hx of  section  (Dichorionic diamniotic twin pregnancy in third trimester [O30.043]; Hx of  section [Z98.891]) MD John Toribio CRNA          Anesthesia Type: spinal, general  Last vitals  BP   110\78   Temp   98.3   Hr 100    resp  16   SpO2   98     Post Anesthesia Care and Evaluation    Patient location during evaluation: PACU  Patient participation: complete - patient participated  Level of consciousness: awake  Pain score: 0  Pain management: adequate  Airway patency: patent  Anesthetic complications: No anesthetic complications  PONV Status: controlled  Cardiovascular status: acceptable and stable  Respiratory status: acceptable and room air  Hydration status: acceptable    Comments: t8

## 2017-11-01 LAB
BACTERIA SPEC AEROBE CULT: NORMAL
BASOPHILS # BLD AUTO: 0.05 10*3/MM3 (ref 0–0.2)
BASOPHILS NFR BLD AUTO: 0.5 % (ref 0–2.5)
DEPRECATED RDW RBC AUTO: 44.3 FL (ref 37–54)
EOSINOPHIL # BLD AUTO: 0.04 10*3/MM3 (ref 0–0.7)
EOSINOPHIL NFR BLD AUTO: 0.4 % (ref 0–7)
ERYTHROCYTE [DISTWIDTH] IN BLOOD BY AUTOMATED COUNT: 14.9 % (ref 11.5–14.5)
GP B STREP DNA SPEC QL NAA+PROBE: NEGATIVE
HCT VFR BLD AUTO: 25.8 % (ref 37–47)
HGB BLD-MCNC: 8.4 G/DL (ref 12–16)
IMM GRANULOCYTES # BLD: 0.07 10*3/MM3 (ref 0–0.06)
IMM GRANULOCYTES NFR BLD: 0.6 % (ref 0–0.6)
LYMPHOCYTES # BLD AUTO: 1.16 10*3/MM3 (ref 0.6–3.4)
LYMPHOCYTES NFR BLD AUTO: 10.5 % (ref 10–50)
MCH RBC QN AUTO: 27.2 PG (ref 27–31)
MCHC RBC AUTO-ENTMCNC: 32.6 G/DL (ref 30–37)
MCV RBC AUTO: 83.5 FL (ref 81–99)
MONOCYTES # BLD AUTO: 0.77 10*3/MM3 (ref 0–0.9)
MONOCYTES NFR BLD AUTO: 6.9 % (ref 0–12)
NEUTROPHILS # BLD AUTO: 9 10*3/MM3 (ref 2–6.9)
NEUTROPHILS NFR BLD AUTO: 81.1 % (ref 37–80)
NRBC BLD MANUAL-RTO: 0 /100 WBC (ref 0–0)
PLATELET # BLD AUTO: 212 10*3/MM3 (ref 130–400)
PMV BLD AUTO: 9.4 FL (ref 6–12)
RBC # BLD AUTO: 3.09 10*6/MM3 (ref 4.2–5.4)
WBC NRBC COR # BLD: 11.09 10*3/MM3 (ref 4.8–10.8)

## 2017-11-01 PROCEDURE — 85025 COMPLETE CBC W/AUTO DIFF WBC: CPT | Performed by: OBSTETRICS & GYNECOLOGY

## 2017-11-01 PROCEDURE — 25010000002 MAGNESIUM SULFATE 2 GM/50ML SOLUTION: Performed by: NURSE PRACTITIONER

## 2017-11-01 RX ORDER — MAGNESIUM SULFATE HEPTAHYDRATE 40 MG/ML
2 INJECTION, SOLUTION INTRAVENOUS
Status: COMPLETED | OUTPATIENT
Start: 2017-11-01 | End: 2017-11-01

## 2017-11-01 RX ORDER — DIPHENHYDRAMINE HYDROCHLORIDE 50 MG/ML
25 INJECTION INTRAMUSCULAR; INTRAVENOUS EVERY 6 HOURS PRN
Status: DISCONTINUED | OUTPATIENT
Start: 2017-11-01 | End: 2017-11-03 | Stop reason: HOSPADM

## 2017-11-01 RX ORDER — MAGNESIUM SULFATE HEPTAHYDRATE 40 MG/ML
2 INJECTION, SOLUTION INTRAVENOUS
Status: DISCONTINUED | OUTPATIENT
Start: 2017-11-01 | End: 2017-11-01

## 2017-11-01 RX ADMIN — OXYCODONE AND ACETAMINOPHEN 2 TABLET: 5; 325 TABLET ORAL at 09:52

## 2017-11-01 RX ADMIN — SIMETHICONE CHEW TAB 80 MG 80 MG: 80 TABLET ORAL at 09:53

## 2017-11-01 RX ADMIN — DOCUSATE SODIUM 100 MG: 100 CAPSULE, LIQUID FILLED ORAL at 20:12

## 2017-11-01 RX ADMIN — IBUPROFEN 600 MG: 600 TABLET ORAL at 15:39

## 2017-11-01 RX ADMIN — MAGNESIUM SULFATE HEPTAHYDRATE 2 G: 40 INJECTION, SOLUTION INTRAVENOUS at 01:00

## 2017-11-01 RX ADMIN — OXYCODONE AND ACETAMINOPHEN 2 TABLET: 5; 325 TABLET ORAL at 20:14

## 2017-11-01 RX ADMIN — MAGNESIUM SULFATE HEPTAHYDRATE 2 G: 40 INJECTION, SOLUTION INTRAVENOUS at 02:02

## 2017-11-01 RX ADMIN — OXYCODONE AND ACETAMINOPHEN 2 TABLET: 5; 325 TABLET ORAL at 14:29

## 2017-11-01 RX ADMIN — SIMETHICONE CHEW TAB 80 MG 80 MG: 80 TABLET ORAL at 20:12

## 2017-11-01 RX ADMIN — SODIUM CHLORIDE, POTASSIUM CHLORIDE, SODIUM LACTATE AND CALCIUM CHLORIDE 1000 ML: 600; 310; 30; 20 INJECTION, SOLUTION INTRAVENOUS at 03:08

## 2017-11-01 RX ADMIN — IBUPROFEN 600 MG: 600 TABLET ORAL at 20:14

## 2017-11-01 RX ADMIN — MAGNESIUM SULFATE HEPTAHYDRATE 2 G: 40 INJECTION, SOLUTION INTRAVENOUS at 06:05

## 2017-11-01 RX ADMIN — MAGNESIUM SULFATE HEPTAHYDRATE 2 G: 40 INJECTION, SOLUTION INTRAVENOUS at 03:09

## 2017-11-01 RX ADMIN — MAGNESIUM SULFATE HEPTAHYDRATE 2 G: 40 INJECTION, SOLUTION INTRAVENOUS at 04:05

## 2017-11-01 RX ADMIN — MAGNESIUM SULFATE HEPTAHYDRATE 2 G: 40 INJECTION, SOLUTION INTRAVENOUS at 05:02

## 2017-11-01 RX ADMIN — OXYCODONE AND ACETAMINOPHEN 2 TABLET: 5; 325 TABLET ORAL at 05:12

## 2017-11-01 NOTE — PLAN OF CARE
Problem: Patient Care Overview (Adult)  Goal: Plan of Care Review  Outcome: Ongoing (interventions implemented as appropriate)    17   Coping/Psychosocial Response Interventions   Plan Of Care Reviewed With patient   Patient Care Overview   Progress progress toward functional goals as expected       Goal: Adult Individualization and Mutuality  Outcome: Ongoing (interventions implemented as appropriate)    17   Individualization   Patient Specific Preferences Plans to breast and bottle feed       Goal: Discharge Needs Assessment  Outcome: Ongoing (interventions implemented as appropriate)    17   Discharge Needs Assessment   Concerns To Be Addressed no discharge needs identified   Equipment Needed After Discharge none   Discharge Disposition home or self-care   Current Health   Anticipated Changes Related to Illness none   Self-Care   Equipment Currently Used at Home none   Living Environment   Transportation Available car;family or friend will provide         Problem: Postpartum ( Delivery) (Adult)  Goal: Signs and Symptoms of Listed Potential Problems Will be Absent or Manageable (Postpartum)  Outcome: Ongoing (interventions implemented as appropriate)    17   Postpartum ( Delivery)   Problems Assessed (Postpartum ) all   Problems Present (Postpartum ) none

## 2017-11-02 RX ORDER — FERROUS SULFATE TAB EC 324 MG (65 MG FE EQUIVALENT) 324 (65 FE) MG
324 TABLET DELAYED RESPONSE ORAL 2 TIMES DAILY WITH MEALS
Status: DISCONTINUED | OUTPATIENT
Start: 2017-11-02 | End: 2017-11-03 | Stop reason: HOSPADM

## 2017-11-02 RX ORDER — LABETALOL 100 MG/1
200 TABLET, FILM COATED ORAL EVERY 12 HOURS SCHEDULED
Status: DISCONTINUED | OUTPATIENT
Start: 2017-11-02 | End: 2017-11-03 | Stop reason: HOSPADM

## 2017-11-02 RX ADMIN — ONDANSETRON 4 MG: 4 TABLET, FILM COATED ORAL at 03:25

## 2017-11-02 RX ADMIN — OXYCODONE AND ACETAMINOPHEN 2 TABLET: 5; 325 TABLET ORAL at 17:42

## 2017-11-02 RX ADMIN — SIMETHICONE CHEW TAB 80 MG 80 MG: 80 TABLET ORAL at 20:45

## 2017-11-02 RX ADMIN — OXYCODONE AND ACETAMINOPHEN 2 TABLET: 5; 325 TABLET ORAL at 03:25

## 2017-11-02 RX ADMIN — IBUPROFEN 600 MG: 600 TABLET ORAL at 22:53

## 2017-11-02 RX ADMIN — FERROUS SULFATE TAB EC 324 MG (65 MG FE EQUIVALENT) 324 MG: 324 (65 FE) TABLET DELAYED RESPONSE at 17:42

## 2017-11-02 RX ADMIN — OXYCODONE AND ACETAMINOPHEN 2 TABLET: 5; 325 TABLET ORAL at 22:53

## 2017-11-02 RX ADMIN — LABETALOL HCL 200 MG: 100 TABLET, FILM COATED ORAL at 09:31

## 2017-11-02 RX ADMIN — SIMETHICONE CHEW TAB 80 MG 80 MG: 80 TABLET ORAL at 13:17

## 2017-11-02 RX ADMIN — DOCUSATE SODIUM 100 MG: 100 CAPSULE, LIQUID FILLED ORAL at 20:45

## 2017-11-02 RX ADMIN — IBUPROFEN 600 MG: 600 TABLET ORAL at 13:16

## 2017-11-02 RX ADMIN — OXYCODONE AND ACETAMINOPHEN 2 TABLET: 5; 325 TABLET ORAL at 13:17

## 2017-11-02 RX ADMIN — SIMETHICONE CHEW TAB 80 MG 80 MG: 80 TABLET ORAL at 17:41

## 2017-11-02 RX ADMIN — LABETALOL HCL 200 MG: 100 TABLET, FILM COATED ORAL at 20:54

## 2017-11-02 RX ADMIN — OXYCODONE AND ACETAMINOPHEN 2 TABLET: 5; 325 TABLET ORAL at 08:06

## 2017-11-02 RX ADMIN — SIMETHICONE CHEW TAB 80 MG 80 MG: 80 TABLET ORAL at 08:06

## 2017-11-02 NOTE — PLAN OF CARE
Problem: Patient Care Overview (Adult)  Goal: Plan of Care Review  Outcome: Ongoing (interventions implemented as appropriate)    17   Coping/Psychosocial Response Interventions   Plan Of Care Reviewed With patient   Outcome Evaluation   Outcome Summary/Follow up Plan Fundus firm, bleeding scant, pain controlled adequately.   Patient Care Overview   Progress improving       Goal: Adult Individualization and Mutuality  Outcome: Ongoing (interventions implemented as appropriate)    17   Individualization   Patient Specific Goals Pt. will have good pain control today and will not feel nauseous.       Goal: Discharge Needs Assessment  Outcome: Ongoing (interventions implemented as appropriate)    17   Discharge Needs Assessment   Concerns To Be Addressed no discharge needs identified   Readmission Within The Last 30 Days no previous admission in last 30 days   Equipment Needed After Discharge none   Discharge Disposition home or self-care   Current Health   Anticipated Changes Related to Illness none   Self-Care   Equipment Currently Used at Home none   Living Environment   Transportation Available car;family or friend will provide         Problem: Postpartum ( Delivery) (Adult)  Goal: Signs and Symptoms of Listed Potential Problems Will be Absent or Manageable (Postpartum)  Outcome: Ongoing (interventions implemented as appropriate)    17   Postpartum ( Delivery)   Problems Assessed (Postpartum ) all   Problems Present (Postpartum ) none

## 2017-11-02 NOTE — PLAN OF CARE
Problem: Patient Care Overview (Adult)  Goal: Plan of Care Review  Outcome: Ongoing (interventions implemented as appropriate)    17   Coping/Psychosocial Response Interventions   Plan Of Care Reviewed With patient   Patient Care Overview   Progress progress toward functional goals as expected       Goal: Adult Individualization and Mutuality  Outcome: Ongoing (interventions implemented as appropriate)    17   Individualization   Patient Specific Preferences breast and bottle feeding   Patient Specific Goals Pain control and BP WNL   Patient Specific Interventions Medication as needed       Goal: Discharge Needs Assessment  Outcome: Ongoing (interventions implemented as appropriate)    17   Discharge Needs Assessment   Concerns To Be Addressed no discharge needs identified   Equipment Needed After Discharge none   Discharge Disposition home or self-care   Current Health   Anticipated Changes Related to Illness none   Self-Care   Equipment Currently Used at Home none   Living Environment   Transportation Available car;family or friend will provide         Problem: Postpartum ( Delivery) (Adult)  Goal: Signs and Symptoms of Listed Potential Problems Will be Absent or Manageable (Postpartum)  Outcome: Ongoing (interventions implemented as appropriate)    17   Postpartum ( Delivery)   Problems Assessed (Postpartum ) all   Problems Present (Postpartum ) none

## 2017-11-03 VITALS
TEMPERATURE: 98.3 F | OXYGEN SATURATION: 98 % | WEIGHT: 239 LBS | BODY MASS INDEX: 35.4 KG/M2 | SYSTOLIC BLOOD PRESSURE: 135 MMHG | DIASTOLIC BLOOD PRESSURE: 72 MMHG | HEART RATE: 90 BPM | RESPIRATION RATE: 18 BRPM | HEIGHT: 69 IN

## 2017-11-03 PROBLEM — O30.049 DICHORIONIC DIAMNIOTIC TWIN GESTATION: Status: RESOLVED | Noted: 2017-04-25 | Resolved: 2017-11-03

## 2017-11-03 PROBLEM — Z98.891 HX OF CESAREAN SECTION: Status: RESOLVED | Noted: 2017-10-02 | Resolved: 2017-11-03

## 2017-11-03 PROBLEM — O22.40 HEMORRHOIDS IN PREGNANCY: Status: RESOLVED | Noted: 2017-08-15 | Resolved: 2017-11-03

## 2017-11-03 PROBLEM — O22.00 VARICOSE VEINS DURING PREGNANCY, ANTEPARTUM: Status: RESOLVED | Noted: 2017-08-15 | Resolved: 2017-11-03

## 2017-11-03 RX ORDER — LABETALOL 200 MG/1
200 TABLET, FILM COATED ORAL EVERY 12 HOURS SCHEDULED
Qty: 60 TABLET | Refills: 2 | Status: SHIPPED | OUTPATIENT
Start: 2017-11-03 | End: 2017-12-15

## 2017-11-03 RX ORDER — IBUPROFEN 600 MG/1
600 TABLET ORAL EVERY 6 HOURS PRN
Qty: 60 TABLET | Refills: 0 | Status: SHIPPED | OUTPATIENT
Start: 2017-11-03 | End: 2017-12-15

## 2017-11-03 RX ORDER — OXYCODONE HYDROCHLORIDE AND ACETAMINOPHEN 5; 325 MG/1; MG/1
2 TABLET ORAL EVERY 4 HOURS PRN
Qty: 30 TABLET | Refills: 0 | Status: SHIPPED | OUTPATIENT
Start: 2017-11-03 | End: 2017-11-09

## 2017-11-03 RX ORDER — FERROUS SULFATE TAB EC 324 MG (65 MG FE EQUIVALENT) 324 (65 FE) MG
324 TABLET DELAYED RESPONSE ORAL 2 TIMES DAILY WITH MEALS
Qty: 60 TABLET | Refills: 1 | Status: SHIPPED | OUTPATIENT
Start: 2017-11-03 | End: 2017-12-15

## 2017-11-03 RX ADMIN — IBUPROFEN 600 MG: 600 TABLET ORAL at 06:14

## 2017-11-03 RX ADMIN — LABETALOL HCL 200 MG: 100 TABLET, FILM COATED ORAL at 08:29

## 2017-11-03 RX ADMIN — OXYCODONE AND ACETAMINOPHEN 2 TABLET: 5; 325 TABLET ORAL at 06:14

## 2017-11-03 RX ADMIN — FERROUS SULFATE TAB EC 324 MG (65 MG FE EQUIVALENT) 324 MG: 324 (65 FE) TABLET DELAYED RESPONSE at 08:28

## 2017-11-03 NOTE — DISCHARGE SUMMARY
Discharge Summary     Antione Carter  : 1990  MRN: [id number]  CSN: [id number]    Date of Admission: 10/30/2017   Date of Discharge:  11/3/2017   Delivering Physician: Zaria Thompson MD       Admission Diagnosis: 1. Pregnancy [Z34.90]   Discharge Diagnosis: 1. Same as above plus  2. Pregnancy at 35w3d - delivered       Procedures: 1. Repeat  (LTCS)     Hospital Course  See the completed operative report for details regarding antepartum course and delivery.    Her post-operative course was unremarkable.  On POD # 3 she felt like she was ready for D/C.  She was evaluated and it was determined she was able to be discharged to home.  She had no febrile morbidity. She had normal bowel and bladder function and was hemodynamically stable.  Her wound was healing well without obvious signs of infections. Her BP has been doing well on meds. Breastfeeding going well.    Infant     Rand Carter [[id number]]   male     Arden Carter [[id number]]   female   fetus weighing      Rand Carter [[id number]]   6 lb 9 oz (2.977 kg)     Arden Carter [[id number]]   6 lb 8 oz (2.948 kg)    Apgars -       Rand Carter [[id number]]   8      Arden Carter [[id number]]   8   @ 1 minute /       Rand Carter [[id number]]   9      Arden Carter [[id number]]      @ 5 minutes.    Discharge labs  Lab Results   Component Value Date    WBC 11.09 (H) 2017    HGB 8.4 (L) 2017    HCT 25.8 (L) 2017     2017       Discharge Medications   Pavithra Carter   Home Medication Instructions DOTTIE:[id number]    Printed on:17 0839   Medication Information                      ferrous sulfate 324 (65 Fe) MG tablet delayed-release EC tablet  Take 1 tablet by mouth 2 (Two) Times a Day With Meals.             hydrocortisone (ANUSOL-HC) 2.5 % rectal cream  Apply rectally 2 times daily             ibuprofen (ADVIL,MOTRIN) 600 MG  tablet  Take 1 tablet by mouth Every 6 (Six) Hours As Needed for Mild Pain , Moderate Pain  or Headache (May give in combination with Percocet).             labetalol (NORMODYNE) 200 MG tablet  Take 1 tablet by mouth Every 12 (Twelve) Hours.             oxyCODONE-acetaminophen (PERCOCET) 5-325 MG per tablet  Take 2 tablets by mouth Every 4 (Four) Hours As Needed for Moderate Pain  or Severe Pain .             Prenatal Vit-Fe Fumarate-FA (PRENATAL, CLASSIC, VITAMIN) 28-0.8 MG tablet tablet  Take  by mouth Daily.             promethazine (PHENERGAN) 25 MG tablet  Take 0.5 tablets by mouth Every 6 (Six) Hours As Needed for Nausea or Vomiting.                 Discharge Disposition Home or Self Care   Condition on Discharge: good   Follow-up: 1 week with [facility].     Joan Baltazar, APRN  11/3/2017

## 2017-11-03 NOTE — PLAN OF CARE
Problem: Patient Care Overview (Adult)  Goal: Plan of Care Review  Outcome: Ongoing (interventions implemented as appropriate)    17   Coping/Psychosocial Response Interventions   Plan Of Care Reviewed With patient   Outcome Evaluation   Outcome Summary/Follow up Plan Pain controlled, bleeding scant, caring for infants.   Patient Care Overview   Progress improving       Goal: Adult Individualization and Mutuality  Outcome: Ongoing (interventions implemented as appropriate)    17   Individualization   Patient Specific Goals Pt. will be d/c'd today.       Goal: Discharge Needs Assessment  Outcome: Ongoing (interventions implemented as appropriate)    17   Discharge Needs Assessment   Concerns To Be Addressed no discharge needs identified   Readmission Within The Last 30 Days no previous admission in last 30 days   Equipment Needed After Discharge none   Discharge Disposition home or self-care   Current Health   Anticipated Changes Related to Illness none   Self-Care   Equipment Currently Used at Home none   Living Environment   Transportation Available car;family or friend will provide         Problem: Postpartum ( Delivery) (Adult)  Goal: Signs and Symptoms of Listed Potential Problems Will be Absent or Manageable (Postpartum)  Outcome: Ongoing (interventions implemented as appropriate)    17   Postpartum ( Delivery)   Problems Assessed (Postpartum ) all   Problems Present (Postpartum ) none

## 2017-11-05 LAB
LAB AP CASE REPORT: NORMAL
Lab: NORMAL
PATH REPORT.FINAL DX SPEC: NORMAL

## 2017-11-09 ENCOUNTER — OFFICE VISIT (OUTPATIENT)
Dept: OBSTETRICS AND GYNECOLOGY | Facility: CLINIC | Age: 27
End: 2017-11-09

## 2017-11-09 VITALS
WEIGHT: 189 LBS | SYSTOLIC BLOOD PRESSURE: 142 MMHG | DIASTOLIC BLOOD PRESSURE: 84 MMHG | HEIGHT: 69 IN | BODY MASS INDEX: 27.99 KG/M2

## 2017-11-09 DIAGNOSIS — Z09 POSTOP CHECK: Primary | ICD-10-CM

## 2017-11-09 PROBLEM — O30.043 DICHORIONIC DIAMNIOTIC TWIN PREGNANCY IN THIRD TRIMESTER: Status: RESOLVED | Noted: 2017-10-28 | Resolved: 2017-11-09

## 2017-11-09 PROCEDURE — 99024 POSTOP FOLLOW-UP VISIT: CPT | Performed by: MIDWIFE

## 2017-11-09 NOTE — PROGRESS NOTES
"    Postpartum Progress Note    Patient name: Pavithra Carter  Date of Service: 2017    ID: 26 y.o.     Diagnosis:   1 week post op  section  She is taking Labetalol qhs. States it makes her feel bad if she takes it twice daily.    Patient Active Problem List   Diagnosis   • S/P repeat low transverse        Subjective:      No complaints, Lochia light .  Ambulating, B&B habits wnl, tolerating regular diet  Pain well controlled.  The patient is not currently breastfeeding.       Objective:      Vital signs:  /84  Ht 69\" (175.3 cm)  Wt 189 lb (85.7 kg)  Breastfeeding? No  BMI 27.91 kg/m2   General: Alert & oriented x4, in no apparent distress  Abdomen: soft, nontender  Uterus: firm, nontender  Incision: healing well  Extremities: nontender; no edema      Labs:  Lab Results   Component Value Date    WBC 11.09 (H) 2017    HGB 8.4 (L) 2017    HCT 25.8 (L) 2017    MCV 83.5 2017     2017             Assessment/Plan:        1. S/p  delivery: Doing well.  2. Infant feeding: Supportive care.  The patient is not currently breastfeeding.  3. Contraception: didn't want education regarding options for contraception  4. RTO 5 weeks     Joan Baltazar CNM  2017    "

## 2017-12-15 ENCOUNTER — POSTPARTUM VISIT (OUTPATIENT)
Dept: OBSTETRICS AND GYNECOLOGY | Facility: CLINIC | Age: 27
End: 2017-12-15

## 2017-12-15 VITALS
HEIGHT: 69 IN | WEIGHT: 185 LBS | BODY MASS INDEX: 27.4 KG/M2 | DIASTOLIC BLOOD PRESSURE: 67 MMHG | SYSTOLIC BLOOD PRESSURE: 136 MMHG

## 2017-12-15 PROCEDURE — 0503F POSTPARTUM CARE VISIT: CPT | Performed by: OBSTETRICS & GYNECOLOGY

## 2017-12-15 RX ORDER — NORETHINDRONE ACETATE AND ETHINYL ESTRADIOL AND FERROUS FUMARATE 1MG-20(24)
1 KIT ORAL DAILY
Qty: 28 TABLET | Refills: 11 | Status: SHIPPED | OUTPATIENT
Start: 2017-12-15 | End: 2018-02-13

## 2017-12-15 NOTE — PROGRESS NOTES
Subjective  Chief Complaint   Patient presents with   • Postpartum Care     Patient wants to discuss oral contraceptives.      Patient is 27 y.o.  here for 6 wk pp visit.  Pt doing well.  Pt stopped bleeding 1-2 weeks ago.  Pt is bottle feeding.  Pt would like to consider ocps.  Pt is off antihypertensive medication.  Pt denies any symptoms or problems.    History  Past Medical History:   Diagnosis Date   • Female infertility    • Migraine    • Multiple gestation    • PMS (premenstrual syndrome)    • Preeclampsia 2009     Current Outpatient Prescriptions on File Prior to Visit   Medication Sig Dispense Refill   • Prenatal Vit-Fe Fumarate-FA (PRENATAL, CLASSIC, VITAMIN) 28-0.8 MG tablet tablet Take  by mouth Daily.     • [DISCONTINUED] ferrous sulfate 324 (65 Fe) MG tablet delayed-release EC tablet Take 1 tablet by mouth 2 (Two) Times a Day With Meals. 60 tablet 1   • [DISCONTINUED] ibuprofen (ADVIL,MOTRIN) 600 MG tablet Take 1 tablet by mouth Every 6 (Six) Hours As Needed for Mild Pain , Moderate Pain  or Headache (May give in combination with Percocet). 60 tablet 0   • [DISCONTINUED] labetalol (NORMODYNE) 200 MG tablet Take 1 tablet by mouth Every 12 (Twelve) Hours. 60 tablet 2     No current facility-administered medications on file prior to visit.      No Known Allergies  Past Surgical History:   Procedure Laterality Date   •  SECTION  2009   •  SECTION N/A 10/31/2017    Procedure:  SECTION REPEAT, twins;  Surgeon: Zaria Thompson MD;  Location: [facility];  Service:      Family History   Problem Relation Age of Onset   • Hypertension Father    • Hyperlipidemia Father    • Lung cancer Father    • Diabetes Father    • Hypertension Mother    • Hyperlipidemia Mother    • Diabetes Mother    • No Known Problems Sister    • No Known Problems Son    • No Known Problems Paternal Grandfather    • No Known Problems Paternal Grandmother    • Diabetes Maternal Grandmother    • Diabetes  "Maternal Grandfather      Social History     Social History   • Marital status:      Spouse name: N/A   • Number of children: N/A   • Years of education: N/A     Social History Main Topics   • Smoking status: Never Smoker   • Smokeless tobacco: Never Used   • Alcohol use No   • Drug use: No   • Sexual activity: Yes     Partners: Male     Birth control/ protection: None     Other Topics Concern   • None     Social History Narrative     Review of Systems  The following systems were reviewed and negative:  constitution, eyes, ENT, respiratory, cardiovascular, gastrointestinal, genitourinary, integument, breast, hematologic / lymphatic, musculoskeletal, neurological, behavioral/psych, endocrine and allergies / immunologic     Objective  Vitals:    12/15/17 1345   BP: 136/67   Weight: 83.9 kg (185 lb)   Height: 175.3 cm (69\")     Physical Exam:  General Appearance: alert, appears stated age and cooperative  Head: normocephalic, without obvious abnormality and atraumatic  Eyes: lids and lashes normal, conjunctivae and sclerae normal, no icterus, no pallor, corneas clear and PERRLA  Ears: ears appear intact with no abnormalities noted  Nose: nares normal, septum midline, mucosa normal and no drainage  Neck: suppple, trachea midline and no thyromegaly  Lungs: clear to auscultation, respirations regular, respirations even and respirations unlabored  Heart: regular rhythm and normal rate, normal S1, S2, no murmur, gallop, or rubs and no click  Breasts: Not performed.  Abdomen: normal bowel sounds, no masses, no hepatomegaly, no splenomegaly, soft non-tender, no guarding and no rebound tenderness; Inc healed well.  Pelvic: Clinical staff was present for exam  External genitalia:  normal appearance of the external genitalia including Bartholin's and [place]'s glands.  :  urethral meatus normal;  Vaginal:  normal pink mucosa without prolapse or lesions.  Cervix:  normal appearance.  Uterus:  normal size, shape and " consistency.  Adnexa:  normal bimanual exam of the adnexa.  Extremities: moves extremities well, no edema, no cyanosis and no redness  Skin: no bleeding, bruising or rash and no lesions noted  Lymph Nodes: no palpable adenopathy  Psych: normal mood and affect, oriented to person, time and place, thought content organized and appropriate judgment    Lab Review   No data reviewed    Imaging   No data reviewed    Assessment/Plan  Problem List Items Addressed This Visit     None      Visit Diagnoses     Postpartum care and examination    -  Primary  Normal 6 wk visit.  Instructions and precautions given.  Pt desires ocps.  Rx given as noted to start with next menses.  Pt counseled regarding risks and complications as well as benefits.  All questions answered.  Pt to have bp checked periodically as well.        Follow up prn   This note was electronically signed.  Zaria Thompson M.D.

## 2018-02-12 ENCOUNTER — TELEPHONE (OUTPATIENT)
Dept: OBSTETRICS AND GYNECOLOGY | Facility: CLINIC | Age: 28
End: 2018-02-12

## 2018-02-12 NOTE — TELEPHONE ENCOUNTER
----- Message from Shannan Ni sent at 2/12/2018  3:06 PM EST -----  Contact: PT  PT SAW DR CASTRO FOR POST PARTUM VISIT ON 12/15/17 AND WAS PRESCRIBED MINASTRIN 24 FE.  THE RX IS OVER $100, SO SHE ASKED IF WE CAN SEND IN SOMETHING ELSE.  [facility] IN [place].  THANKS

## 2018-02-13 RX ORDER — LEVONORGESTREL AND ETHINYL ESTRADIOL 0.1-0.02MG
1 KIT ORAL DAILY
Qty: 28 TABLET | Refills: 12 | Status: SHIPPED | OUTPATIENT
Start: 2018-02-13 | End: 2019-02-13

## 2020-06-10 NOTE — PROGRESS NOTES
[facility]    Nerve Cath Post Op Call    Patient Name: Pavithra Catrer  :  1990  MRN:  [id number]  Date of Discharge:     Treatment Plan                          No anesthesia related problems  
 Antione Carter  : 1990  MRN: [id number]  CSN: [id number]    Post-operative Day #1  Subjective   Her pain is well controlled.  Vaginal bleeding is appropriate amount.  She is not passing gas and has not had a bowel movement.  Upon review of her respiratory system, she has no respiratory symptoms and and denies SOB, cough, wheezing, chest pain.     Objective     Min/max vitals past 24 hours:   Temp  Min: 97.9 °F (36.6 °C)  Max: 98.6 °F (37 °C)  BP  Min: 102/59  Max: 158/105  Pulse  Min: 82  Max: 129  Resp  Min: 18  Max: 22        General: well developed; well nourished  no acute distress   Resp: breathing is unlabored  clear to auscultation bilaterally   CV: normal apical impulse  regular rate and rhythm   Abdomen: soft, non-tender; no masses  incision is covered by bandage  + bowel sounds   Pelvic: Not performed   Ext: normal appearance with no cyanosis or edema and no calf tenderness     Last 3 values     Results from last 7 days  Lab Units 17  0600 10/30/17  1551 10/26/17  1130   WBC 10*3/mm3 11.09* 13.99* 10.97*   HEMOGLOBIN g/dL 8.4* 11.5* 11.0*   HEMATOCRIT % 25.8* 34.5* 33.1*   PLATELETS 10*3/mm3 212 250 244     Lab Results   Component Value Date    RH Positive 10/30/2017    HEPBSAG Negative 2017          Assessment   1. POD #1 S/P Repeat  (LTCS)   2. BPs down significantly, good urinary output (200-300 ml per hour)     Plan   1. DC Magnesium Sulfate   2. Continue with routine postpartum care    Joan Baltazar, APRDANIEL  2017  8:35 AM           
[facility]   PROGRESS NOTE    Post-Op Day 2 S/P   Subjective   Subjective  Patient reports:  Doing well with x feels full of gas.  Also incision with some pain.   Ambulating and voiding without difficulty.  Tolerating reg diet and positive flatus.  Pain meds adequate for pain.  Denies h/a's or visual disturbances      Objective    Objective     Vitals: Vital Signs Range for the last 24 hours  Temperature: Temp:  [98.2 °F (36.8 °C)-99.1 °F (37.3 °C)] 98.8 °F (37.1 °C)   Temp Source: Temp src: Oral   BP: BP: (133-150)/(78-93) 149/90   Pulse: Heart Rate:  [77-93] 80   Respirations: Resp:  [18-24] 20   SPO2:     O2 Amount (l/min):     O2 Devices     Weight:                Physical Exam    Psych: Altert and oriented to time, place and person  Mood and affect appropriate   General: well developed; well nourished  no acute distress  HEENT: unremarkable  Musculoskeletal: Normal gait  Full range of motion  Lungs:  breathing is unlabored  clear to auscultation bilaterally  Abdomen: Incision dry and intact  abdomen soft + BS's  abd slight distention  Lower Extremities: LE: Neg edema and no calf tenderness    I reviewed the patient's new clinical results.    Assessment/Plan      Active Problems:    Dichorionic diamniotic twin pregnancy in third trimester    S/P repeat low transverse   Anemia  Elevated BP's      Assessment & Plan      Assessment:    Pavithra Carter is Day 2  post-partum       Rand Carter [[id number]]   , Low Transverse     Arden Carter [[id number]]   , Low Transverse        Rand Carter [[id number]]         Arden Carter [[id number]]      .      Plan: encourage suppository -  continue post op care and will start labetalol 200 mg BID .      Rosa Hannah CNM  17  8:57 AM  
37.3

## 2022-02-22 ENCOUNTER — HOSPITAL ENCOUNTER (OUTPATIENT)
Facility: HOSPITAL | Age: 32
Discharge: HOME | End: 2022-02-22

## 2022-03-01 ENCOUNTER — TELEPHONE (OUTPATIENT)
Dept: OBSTETRICS AND GYNECOLOGY | Facility: CLINIC | Age: 32
End: 2022-03-01

## 2022-03-01 RX ORDER — AZITHROMYCIN 250 MG/1
TABLET, FILM COATED ORAL
Qty: 6 TABLET | Refills: 0 | Status: SHIPPED | OUTPATIENT
Start: 2022-03-01 | End: 2022-03-06

## 2022-08-11 ENCOUNTER — HOSPITAL ENCOUNTER (OUTPATIENT)
Facility: HOSPITAL | Age: 32
Discharge: HOME | End: 2022-08-11
Payer: COMMERCIAL

## 2022-08-11 LAB
A/G RATIO: 2.5 (ref 0.8–2)
ALBUMIN SERPL-MCNC: 5.2 G/DL (ref 3.4–4.8)
ALP BLD-CCNC: 89 U/L (ref 25–100)
ALT SERPL-CCNC: 23 U/L (ref 4–36)
ANION GAP SERPL CALCULATED.3IONS-SCNC: 13 MMOL/L (ref 3–16)
AST SERPL-CCNC: 16 U/L (ref 8–33)
BASOPHILS ABSOLUTE: 0 K/UL (ref 0–0.1)
BASOPHILS RELATIVE PERCENT: 0.6 %
BILIRUB SERPL-MCNC: 0.4 MG/DL (ref 0.3–1.2)
BUN BLDV-MCNC: 16 MG/DL (ref 6–20)
CALCIUM SERPL-MCNC: 9.9 MG/DL (ref 8.5–10.5)
CHLORIDE BLD-SCNC: 103 MMOL/L (ref 98–107)
CHOLESTEROL, TOTAL: 217 MG/DL (ref 0–200)
CO2: 24 MMOL/L (ref 20–30)
CREAT SERPL-MCNC: 0.8 MG/DL (ref 0.4–1.2)
EOSINOPHILS ABSOLUTE: 0.2 K/UL (ref 0–0.4)
EOSINOPHILS RELATIVE PERCENT: 2.3 %
FOLATE: 6.98 NG/ML
GFR AFRICAN AMERICAN: >59
GFR NON-AFRICAN AMERICAN: >60
GLOBULIN: 2.1 G/DL
GLUCOSE BLD-MCNC: 125 MG/DL (ref 74–106)
HCT VFR BLD CALC: 43.4 % (ref 37–47)
HDLC SERPL-MCNC: 33 MG/DL (ref 40–60)
HEMOGLOBIN: 14.7 G/DL (ref 11.5–16.5)
IMMATURE GRANULOCYTES #: 0 K/UL
IMMATURE GRANULOCYTES %: 0.3 % (ref 0–5)
LDL CHOLESTEROL CALCULATED: 152 MG/DL
LYMPHOCYTES ABSOLUTE: 1.9 K/UL (ref 1.5–4)
LYMPHOCYTES RELATIVE PERCENT: 26.9 %
MCH RBC QN AUTO: 28.1 PG (ref 27–32)
MCHC RBC AUTO-ENTMCNC: 33.9 G/DL (ref 31–35)
MCV RBC AUTO: 82.8 FL (ref 80–100)
MONOCYTES ABSOLUTE: 0.5 K/UL (ref 0.2–0.8)
MONOCYTES RELATIVE PERCENT: 6.6 %
NEUTROPHILS ABSOLUTE: 4.4 K/UL (ref 2–7.5)
NEUTROPHILS RELATIVE PERCENT: 63.3 %
PDW BLD-RTO: 13 % (ref 11–16)
PLATELET # BLD: 274 K/UL (ref 150–400)
PMV BLD AUTO: 9.4 FL (ref 6–10)
POTASSIUM SERPL-SCNC: 4.3 MMOL/L (ref 3.4–5.1)
RBC # BLD: 5.24 M/UL (ref 3.8–5.8)
SODIUM BLD-SCNC: 140 MMOL/L (ref 136–145)
TOTAL PROTEIN: 7.3 G/DL (ref 6.4–8.3)
TRIGL SERPL-MCNC: 162 MG/DL (ref 0–249)
TSH SERPL DL<=0.05 MIU/L-ACNC: 2.69 UIU/ML (ref 0.27–4.2)
VITAMIN B-12: 339 PG/ML (ref 211–911)
VLDLC SERPL CALC-MCNC: 32 MG/DL
WBC # BLD: 7 K/UL (ref 4–11)

## 2022-08-11 PROCEDURE — 36415 COLL VENOUS BLD VENIPUNCTURE: CPT

## 2022-08-11 PROCEDURE — 80053 COMPREHEN METABOLIC PANEL: CPT

## 2022-08-11 PROCEDURE — 85025 COMPLETE CBC W/AUTO DIFF WBC: CPT

## 2022-08-11 PROCEDURE — 84443 ASSAY THYROID STIM HORMONE: CPT

## 2022-08-11 PROCEDURE — 82746 ASSAY OF FOLIC ACID SERUM: CPT

## 2022-08-11 PROCEDURE — 80061 LIPID PANEL: CPT

## 2022-08-11 PROCEDURE — 82607 VITAMIN B-12: CPT

## 2022-12-14 ENCOUNTER — HOSPITAL ENCOUNTER (OUTPATIENT)
Facility: HOSPITAL | Age: 32
Discharge: HOME | End: 2022-12-14
Payer: COMMERCIAL

## 2022-12-14 LAB
A/G RATIO: 2.5 (ref 0.8–2)
ALBUMIN SERPL-MCNC: 4.9 G/DL (ref 3.4–4.8)
ALP BLD-CCNC: 95 U/L (ref 25–100)
ALT SERPL-CCNC: 24 U/L (ref 4–36)
ANION GAP SERPL CALCULATED.3IONS-SCNC: 11 MMOL/L (ref 3–16)
AST SERPL-CCNC: 17 U/L (ref 8–33)
BASOPHILS ABSOLUTE: 0.1 K/UL (ref 0–0.1)
BASOPHILS RELATIVE PERCENT: 0.8 %
BILIRUB SERPL-MCNC: 0.4 MG/DL (ref 0.3–1.2)
BUN BLDV-MCNC: 10 MG/DL (ref 6–20)
CALCIUM SERPL-MCNC: 9.6 MG/DL (ref 8.5–10.5)
CHLORIDE BLD-SCNC: 100 MMOL/L (ref 98–107)
CHOLESTEROL, TOTAL: 187 MG/DL (ref 0–200)
CO2: 27 MMOL/L (ref 20–30)
CREAT SERPL-MCNC: 0.7 MG/DL (ref 0.4–1.2)
EOSINOPHILS ABSOLUTE: 0.2 K/UL (ref 0–0.4)
EOSINOPHILS RELATIVE PERCENT: 2.7 %
FOLATE: 9.39 NG/ML
GFR SERPL CREATININE-BSD FRML MDRD: >60 ML/MIN/{1.73_M2}
GLOBULIN: 2 G/DL
GLUCOSE BLD-MCNC: 88 MG/DL (ref 74–106)
HBA1C MFR BLD: 4.7 %
HCT VFR BLD CALC: 44.5 % (ref 37–47)
HDLC SERPL-MCNC: 34 MG/DL (ref 40–60)
HEMOGLOBIN: 14.4 G/DL (ref 11.5–16.5)
IMMATURE GRANULOCYTES #: 0 K/UL
IMMATURE GRANULOCYTES %: 0.4 % (ref 0–5)
LDL CHOLESTEROL CALCULATED: 111 MG/DL
LYMPHOCYTES ABSOLUTE: 1.9 K/UL (ref 1.5–4)
LYMPHOCYTES RELATIVE PERCENT: 25.7 %
MCH RBC QN AUTO: 27.4 PG (ref 27–32)
MCHC RBC AUTO-ENTMCNC: 32.4 G/DL (ref 31–35)
MCV RBC AUTO: 84.8 FL (ref 80–100)
MONOCYTES ABSOLUTE: 0.4 K/UL (ref 0.2–0.8)
MONOCYTES RELATIVE PERCENT: 5.7 %
NEUTROPHILS ABSOLUTE: 4.9 K/UL (ref 2–7.5)
NEUTROPHILS RELATIVE PERCENT: 64.7 %
PDW BLD-RTO: 12.9 % (ref 11–16)
PLATELET # BLD: 340 K/UL (ref 150–400)
PMV BLD AUTO: 9.1 FL (ref 6–10)
POTASSIUM SERPL-SCNC: 4.5 MMOL/L (ref 3.4–5.1)
RBC # BLD: 5.25 M/UL (ref 3.8–5.8)
SODIUM BLD-SCNC: 138 MMOL/L (ref 136–145)
TOTAL PROTEIN: 6.9 G/DL (ref 6.4–8.3)
TRIGL SERPL-MCNC: 210 MG/DL (ref 0–249)
TSH SERPL DL<=0.05 MIU/L-ACNC: 2.78 UIU/ML (ref 0.27–4.2)
VITAMIN B-12: >2000 PG/ML (ref 211–911)
VLDLC SERPL CALC-MCNC: 42 MG/DL
WBC # BLD: 7.5 K/UL (ref 4–11)

## 2022-12-14 PROCEDURE — 80061 LIPID PANEL: CPT

## 2022-12-14 PROCEDURE — 83036 HEMOGLOBIN GLYCOSYLATED A1C: CPT

## 2022-12-14 PROCEDURE — 36415 COLL VENOUS BLD VENIPUNCTURE: CPT

## 2022-12-14 PROCEDURE — 82607 VITAMIN B-12: CPT

## 2022-12-14 PROCEDURE — 85025 COMPLETE CBC W/AUTO DIFF WBC: CPT

## 2022-12-14 PROCEDURE — 80053 COMPREHEN METABOLIC PANEL: CPT

## 2022-12-14 PROCEDURE — 82746 ASSAY OF FOLIC ACID SERUM: CPT

## 2022-12-14 PROCEDURE — 84443 ASSAY THYROID STIM HORMONE: CPT

## 2024-01-08 ENCOUNTER — HOSPITAL ENCOUNTER (OUTPATIENT)
Facility: HOSPITAL | Age: 34
Discharge: HOME | End: 2024-01-08
Payer: COMMERCIAL

## 2024-01-08 LAB
ALBUMIN SERPL-MCNC: 4.8 G/DL (ref 3.4–4.8)
ALBUMIN/GLOB SERPL: 2.3 {RATIO} (ref 0.8–2)
ALP SERPL-CCNC: 72 U/L (ref 25–100)
ALT SERPL-CCNC: 12 U/L (ref 4–36)
ANION GAP SERPL CALCULATED.3IONS-SCNC: 9 MMOL/L (ref 3–16)
AST SERPL-CCNC: 12 U/L (ref 8–33)
BASOPHILS # BLD: 0 K/UL (ref 0–0.1)
BASOPHILS NFR BLD: 0.7 %
BILIRUB SERPL-MCNC: 0.4 MG/DL (ref 0.3–1.2)
BUN SERPL-MCNC: 13 MG/DL (ref 6–20)
CALCIUM SERPL-MCNC: 9.3 MG/DL (ref 8.5–10.5)
CHLORIDE SERPL-SCNC: 104 MMOL/L (ref 98–107)
CHOLEST SERPL-MCNC: 166 MG/DL (ref 0–200)
CO2 SERPL-SCNC: 27 MMOL/L (ref 20–30)
CREAT SERPL-MCNC: 0.7 MG/DL (ref 0.4–1.2)
EOSINOPHIL # BLD: 0.1 K/UL (ref 0–0.4)
EOSINOPHIL NFR BLD: 2 %
ERYTHROCYTE [DISTWIDTH] IN BLOOD BY AUTOMATED COUNT: 12.9 % (ref 11–16)
FOLATE SERPL-MCNC: 6.04 NG/ML
GFR SERPLBLD CREATININE-BSD FMLA CKD-EPI: >60 ML/MIN/{1.73_M2}
GLOBULIN SER CALC-MCNC: 2.1 G/DL
GLUCOSE SERPL-MCNC: 73 MG/DL (ref 74–106)
HBA1C MFR BLD: 4.7 %
HCT VFR BLD AUTO: 42.4 % (ref 37–47)
HDLC SERPL-MCNC: 47 MG/DL (ref 40–60)
HGB BLD-MCNC: 14.5 G/DL (ref 11.5–16.5)
IMM GRANULOCYTES # BLD: 0 K/UL
IMM GRANULOCYTES NFR BLD: 0.3 % (ref 0–5)
LDLC SERPL CALC-MCNC: 107 MG/DL
LYMPHOCYTES # BLD: 1.6 K/UL (ref 1.5–4)
LYMPHOCYTES NFR BLD: 26.4 %
MCH RBC QN AUTO: 29.5 PG (ref 27–32)
MCHC RBC AUTO-ENTMCNC: 34.2 G/DL (ref 31–35)
MCV RBC AUTO: 86.2 FL (ref 80–100)
MONOCYTES # BLD: 0.4 K/UL (ref 0.2–0.8)
MONOCYTES NFR BLD: 6.4 %
NEUTROPHILS # BLD: 3.9 K/UL (ref 2–7.5)
NEUTS SEG NFR BLD: 64.2 %
PLATELET # BLD AUTO: 256 K/UL (ref 150–400)
PMV BLD AUTO: 9.1 FL (ref 6–10)
POTASSIUM SERPL-SCNC: 5.2 MMOL/L (ref 3.4–5.1)
PROT SERPL-MCNC: 6.9 G/DL (ref 6.4–8.3)
RBC # BLD AUTO: 4.92 M/UL (ref 3.8–5.8)
SODIUM SERPL-SCNC: 140 MMOL/L (ref 136–145)
TRIGL SERPL-MCNC: 59 MG/DL (ref 0–249)
TSH SERPL DL<=0.005 MIU/L-ACNC: 1.9 UIU/ML (ref 0.27–4.2)
VIT B12 SERPL-MCNC: 874 PG/ML (ref 211–911)
VLDLC SERPL CALC-MCNC: 12 MG/DL
WBC # BLD AUTO: 6.1 K/UL (ref 4–11)

## 2024-01-08 PROCEDURE — 36415 COLL VENOUS BLD VENIPUNCTURE: CPT

## 2024-01-08 PROCEDURE — 83036 HEMOGLOBIN GLYCOSYLATED A1C: CPT

## 2024-01-08 PROCEDURE — 82746 ASSAY OF FOLIC ACID SERUM: CPT

## 2024-01-08 PROCEDURE — 84443 ASSAY THYROID STIM HORMONE: CPT

## 2024-01-08 PROCEDURE — 80053 COMPREHEN METABOLIC PANEL: CPT

## 2024-01-08 PROCEDURE — 80061 LIPID PANEL: CPT

## 2024-01-08 PROCEDURE — 82607 VITAMIN B-12: CPT

## 2024-01-08 PROCEDURE — 85025 COMPLETE CBC W/AUTO DIFF WBC: CPT

## 2024-06-18 ENCOUNTER — HOSPITAL ENCOUNTER (OUTPATIENT)
Facility: HOSPITAL | Age: 34
Discharge: HOME | End: 2024-06-18
Payer: COMMERCIAL

## 2024-06-18 LAB
ALBUMIN SERPL-MCNC: 4.9 G/DL (ref 3.4–4.8)
ALBUMIN/GLOB SERPL: 2.6 {RATIO} (ref 0.8–2)
ALP SERPL-CCNC: 88 U/L (ref 25–100)
ALT SERPL-CCNC: 13 U/L (ref 4–36)
ANION GAP SERPL CALCULATED.3IONS-SCNC: 10 MMOL/L (ref 3–16)
AST SERPL-CCNC: 16 U/L (ref 8–33)
BASOPHILS # BLD: 0.1 K/UL (ref 0–0.1)
BASOPHILS NFR BLD: 1 %
BILIRUB SERPL-MCNC: 0.5 MG/DL (ref 0.3–1.2)
BUN SERPL-MCNC: 12 MG/DL (ref 6–20)
CALCIUM SERPL-MCNC: 9.5 MG/DL (ref 8.5–10.5)
CHLORIDE SERPL-SCNC: 102 MMOL/L (ref 98–107)
CHOLEST SERPL-MCNC: 187 MG/DL (ref 0–200)
CO2 SERPL-SCNC: 29 MMOL/L (ref 20–30)
CREAT SERPL-MCNC: 0.8 MG/DL (ref 0.4–1.2)
EOSINOPHIL # BLD: 0.2 K/UL (ref 0–0.4)
EOSINOPHIL NFR BLD: 3.2 %
ERYTHROCYTE [DISTWIDTH] IN BLOOD BY AUTOMATED COUNT: 13 % (ref 11–16)
FOLATE SERPL-MCNC: 7.02 NG/ML
GFR SERPLBLD CREATININE-BSD FMLA CKD-EPI: >90 ML/MIN/{1.73_M2}
GLOBULIN SER CALC-MCNC: 1.9 G/DL
GLUCOSE SERPL-MCNC: 94 MG/DL (ref 74–106)
HBA1C MFR BLD: 4.8 %
HCT VFR BLD AUTO: 42.5 % (ref 37–47)
HDLC SERPL-MCNC: 40 MG/DL (ref 40–60)
HGB BLD-MCNC: 14.1 G/DL (ref 11.5–16.5)
IMM GRANULOCYTES # BLD: 0 K/UL
IMM GRANULOCYTES NFR BLD: 0.2 % (ref 0–5)
LDLC SERPL CALC-MCNC: 129 MG/DL
LYMPHOCYTES # BLD: 1.6 K/UL (ref 1.5–4)
LYMPHOCYTES NFR BLD: 31.5 %
MCH RBC QN AUTO: 27.9 PG (ref 27–32)
MCHC RBC AUTO-ENTMCNC: 33.2 G/DL (ref 31–35)
MCV RBC AUTO: 84.2 FL (ref 80–100)
MONOCYTES # BLD: 0.4 K/UL (ref 0.2–0.8)
MONOCYTES NFR BLD: 7.5 %
NEUTROPHILS # BLD: 2.9 K/UL (ref 2–7.5)
NEUTS SEG NFR BLD: 56.6 %
PLATELET # BLD AUTO: 268 K/UL (ref 150–400)
PMV BLD AUTO: 8.9 FL (ref 6–10)
POTASSIUM SERPL-SCNC: 4.4 MMOL/L (ref 3.4–5.1)
PROT SERPL-MCNC: 6.8 G/DL (ref 6.4–8.3)
RBC # BLD AUTO: 5.05 M/UL (ref 3.8–5.8)
SODIUM SERPL-SCNC: 141 MMOL/L (ref 136–145)
TRIGL SERPL-MCNC: 88 MG/DL (ref 0–249)
TSH SERPL DL<=0.005 MIU/L-ACNC: 3.58 UIU/ML (ref 0.27–4.2)
VIT B12 SERPL-MCNC: 635 PG/ML (ref 211–911)
VLDLC SERPL CALC-MCNC: 18 MG/DL
WBC # BLD AUTO: 5 K/UL (ref 4–11)

## 2024-06-18 PROCEDURE — 82607 VITAMIN B-12: CPT

## 2024-06-18 PROCEDURE — 85025 COMPLETE CBC W/AUTO DIFF WBC: CPT

## 2024-06-18 PROCEDURE — 80061 LIPID PANEL: CPT

## 2024-06-18 PROCEDURE — 83036 HEMOGLOBIN GLYCOSYLATED A1C: CPT

## 2024-06-18 PROCEDURE — 82746 ASSAY OF FOLIC ACID SERUM: CPT

## 2024-06-18 PROCEDURE — 84443 ASSAY THYROID STIM HORMONE: CPT

## 2024-06-18 PROCEDURE — 80053 COMPREHEN METABOLIC PANEL: CPT

## 2024-06-18 PROCEDURE — 36415 COLL VENOUS BLD VENIPUNCTURE: CPT

## 2025-05-09 ENCOUNTER — HOSPITAL ENCOUNTER (OUTPATIENT)
Dept: ULTRASOUND IMAGING | Facility: HOSPITAL | Age: 35
Discharge: HOME | End: 2025-05-09
Payer: COMMERCIAL

## 2025-05-09 ENCOUNTER — TRANSCRIBE ORDERS (OUTPATIENT)
Dept: NON INVASIVE DIAGNOSTICS | Facility: HOSPITAL | Age: 35
End: 2025-05-09

## 2025-05-09 ENCOUNTER — HOSPITAL ENCOUNTER (OUTPATIENT)
Facility: HOSPITAL | Age: 35
Discharge: HOME | End: 2025-05-09
Payer: COMMERCIAL

## 2025-05-09 ENCOUNTER — HOSPITAL ENCOUNTER (OUTPATIENT)
Dept: NON INVASIVE DIAGNOSTICS | Facility: HOSPITAL | Age: 35
Discharge: HOME | End: 2025-05-09

## 2025-05-09 ENCOUNTER — HOSPITAL ENCOUNTER (OUTPATIENT)
Dept: GENERAL RADIOLOGY | Facility: HOSPITAL | Age: 35
Discharge: HOME | End: 2025-05-09
Payer: COMMERCIAL

## 2025-05-09 DIAGNOSIS — Z01.818 PREOP EXAMINATION: ICD-10-CM

## 2025-05-09 DIAGNOSIS — Z01.818 PREOP EXAMINATION: Primary | ICD-10-CM

## 2025-05-09 LAB
ALBUMIN SERPL-MCNC: 5.1 G/DL (ref 3.4–4.8)
ALBUMIN/GLOB SERPL: 2 {RATIO} (ref 0.8–2)
ALP SERPL-CCNC: 89 U/L (ref 25–100)
ALT SERPL-CCNC: 18 U/L (ref 4–36)
ANION GAP SERPL CALCULATED.3IONS-SCNC: 13 MMOL/L (ref 3–16)
APTT BLD: 28.4 SEC (ref 23.4–35.4)
AST SERPL-CCNC: 16 U/L (ref 8–33)
BASOPHILS # BLD: 0 K/UL (ref 0–0.1)
BASOPHILS NFR BLD: 0.7 %
BILIRUB SERPL-MCNC: 0.7 MG/DL (ref 0.3–1.2)
BUN SERPL-MCNC: 11 MG/DL (ref 6–20)
CALCIUM SERPL-MCNC: 10.1 MG/DL (ref 8.3–10.6)
CHLORIDE SERPL-SCNC: 100 MMOL/L (ref 98–107)
CO2 SERPL-SCNC: 25 MMOL/L (ref 20–30)
CREAT SERPL-MCNC: 0.8 MG/DL (ref 0.4–1.2)
CREAT UR-MCNC: 410 MG/DL (ref 28–259)
EOSINOPHIL # BLD: 0.1 K/UL (ref 0–0.4)
EOSINOPHIL NFR BLD: 1.4 %
ERYTHROCYTE [DISTWIDTH] IN BLOOD BY AUTOMATED COUNT: 12.3 % (ref 11–16)
GFR SERPLBLD CREATININE-BSD FMLA CKD-EPI: >90 ML/MIN/{1.73_M2}
GLOBULIN SER CALC-MCNC: 2.5 G/DL
GLUCOSE SERPL-MCNC: 87 MG/DL (ref 74–106)
HAV IGM SERPL QL IA: NORMAL
HBA1C MFR BLD: 4.6 %
HBV CORE IGM SERPL QL IA: NORMAL
HBV SURFACE AG SERPL QL IA: NORMAL
HCG UR QL: NEGATIVE
HCT VFR BLD AUTO: 44.3 % (ref 37–47)
HCV AB SERPL QL IA: NORMAL
HGB BLD-MCNC: 14.8 G/DL (ref 11.5–16.5)
IMM GRANULOCYTES # BLD: 0 K/UL
IMM GRANULOCYTES NFR BLD: 0.2 % (ref 0–5)
INR PPP: 0.96 (ref 0.88–1.12)
LYMPHOCYTES # BLD: 1.7 K/UL (ref 1.5–4)
LYMPHOCYTES NFR BLD: 30 %
MCH RBC QN AUTO: 29 PG (ref 27–32)
MCHC RBC AUTO-ENTMCNC: 33.4 G/DL (ref 31–35)
MCV RBC AUTO: 86.7 FL (ref 80–100)
MICROALBUMIN UR DL<=1MG/L-MCNC: 4.25 MG/DL (ref 0–22)
MICROALBUMIN/CREAT UR: 10.4 MG/G (ref 0–30)
MONOCYTES # BLD: 0.5 K/UL (ref 0.2–0.8)
MONOCYTES NFR BLD: 9.2 %
NEUTROPHILS # BLD: 3.2 K/UL (ref 2–7.5)
NEUTS SEG NFR BLD: 58.5 %
PLATELET # BLD AUTO: 315 K/UL (ref 150–400)
PMV BLD AUTO: 8.9 FL (ref 6–10)
POTASSIUM SERPL-SCNC: 5 MMOL/L (ref 3.4–5.1)
PROT SERPL-MCNC: 7.6 G/DL (ref 6.4–8.3)
PROTHROMBIN TIME: 13 SEC (ref 12–14.8)
RBC # BLD AUTO: 5.11 M/UL (ref 3.8–5.8)
SODIUM SERPL-SCNC: 138 MMOL/L (ref 136–145)
T3FREE SERPL-MCNC: 3.1 PG/ML (ref 2.3–4.2)
T4 FREE SERPL-MCNC: 1.4 NG/DL (ref 0.92–1.68)
TSH SERPL DL<=0.005 MIU/L-ACNC: 3.08 UIU/ML (ref 0.27–4.2)
WBC # BLD AUTO: 5.5 K/UL (ref 4–11)

## 2025-05-09 PROCEDURE — 84443 ASSAY THYROID STIM HORMONE: CPT

## 2025-05-09 PROCEDURE — 85730 THROMBOPLASTIN TIME PARTIAL: CPT

## 2025-05-09 PROCEDURE — 80053 COMPREHEN METABOLIC PANEL: CPT

## 2025-05-09 PROCEDURE — 84481 FREE ASSAY (FT-3): CPT

## 2025-05-09 PROCEDURE — 82043 UR ALBUMIN QUANTITATIVE: CPT

## 2025-05-09 PROCEDURE — 76641 ULTRASOUND BREAST COMPLETE: CPT

## 2025-05-09 PROCEDURE — 83036 HEMOGLOBIN GLYCOSYLATED A1C: CPT

## 2025-05-09 PROCEDURE — 71046 X-RAY EXAM CHEST 2 VIEWS: CPT

## 2025-05-09 PROCEDURE — 84703 CHORIONIC GONADOTROPIN ASSAY: CPT

## 2025-05-09 PROCEDURE — 80074 ACUTE HEPATITIS PANEL: CPT

## 2025-05-09 PROCEDURE — 85610 PROTHROMBIN TIME: CPT

## 2025-05-09 PROCEDURE — 85025 COMPLETE CBC W/AUTO DIFF WBC: CPT

## 2025-05-09 PROCEDURE — 84439 ASSAY OF FREE THYROXINE: CPT

## 2025-05-09 PROCEDURE — 86702 HIV-2 ANTIBODY: CPT

## 2025-05-09 PROCEDURE — 82570 ASSAY OF URINE CREATININE: CPT

## 2025-05-09 PROCEDURE — 86701 HIV-1ANTIBODY: CPT

## 2025-05-09 PROCEDURE — 87390 HIV-1 AG IA: CPT

## 2025-05-09 PROCEDURE — 36415 COLL VENOUS BLD VENIPUNCTURE: CPT

## 2025-05-10 LAB
EKG ATRIAL RATE: 66 BPM
EKG DIAGNOSIS: NORMAL
EKG P AXIS: 64 DEGREES
EKG P-R INTERVAL: 112 MS
EKG Q-T INTERVAL: 428 MS
EKG QRS DURATION: 78 MS
EKG QTC CALCULATION (BAZETT): 448 MS
EKG R AXIS: 68 DEGREES
EKG T AXIS: 69 DEGREES
EKG VENTRICULAR RATE: 66 BPM
HIV 1+2 AB+HIV1 P24 AG SERPL QL IA: NORMAL
HIV 2 AB SERPL QL IA: NORMAL
HIV1 AB SERPL QL IA: NORMAL
HIV1 P24 AG SERPL QL IA: NORMAL

## 2025-05-12 LAB
EKG ATRIAL RATE: 66 BPM
EKG DIAGNOSIS: NORMAL
EKG P AXIS: 64 DEGREES
EKG P-R INTERVAL: 112 MS
EKG Q-T INTERVAL: 428 MS
EKG QRS DURATION: 78 MS
EKG QTC CALCULATION (BAZETT): 448 MS
EKG R AXIS: 68 DEGREES
EKG T AXIS: 69 DEGREES
EKG VENTRICULAR RATE: 66 BPM

## 2025-05-16 ENCOUNTER — HOSPITAL ENCOUNTER (OUTPATIENT)
Facility: HOSPITAL | Age: 35
Discharge: HOME | End: 2025-05-16
Payer: COMMERCIAL

## 2025-05-16 LAB — HCG INTACT+B SERPL-ACNC: <1 MIU/ML

## 2025-05-16 PROCEDURE — 84702 CHORIONIC GONADOTROPIN TEST: CPT

## 2025-05-16 PROCEDURE — 36415 COLL VENOUS BLD VENIPUNCTURE: CPT

## (undated) DEVICE — LARGE, DISPOSABLE ALEXIS O C-SECTION PROTECTOR - RETRACTOR: Brand: ALEXIS ® O C-SECTION PROTECTOR - RETRACTOR

## (undated) DEVICE — CUP EXTRCT VAC

## (undated) DEVICE — SPNG GZ WOVN 4X4IN 12PLY 10/BX STRL

## (undated) DEVICE — PAD,ABDOMINAL,8"X10",ST,LF: Brand: MEDLINE

## (undated) DEVICE — CUFF SCD HEMOFORCE SEQ CALF STD MD

## (undated) DEVICE — SUTURE GUT CHROMIC 3/0 912H

## (undated) DEVICE — PROXIMATE SKIN STAPLERS (35 WIDE) CONTAINS 35 STAINLESS STEEL STAPLES (FIXED HEAD): Brand: PROXIMATE

## (undated) DEVICE — SUT GUT CHRM 1 CTX 36IN 905H

## (undated) DEVICE — DRSNG TELFA PAD NONADH STR 1S 3X4IN

## (undated) DEVICE — PAD SANI MAXI W/ADHS SNG WRP 11IN

## (undated) DEVICE — GLV SURG BIOGEL M LTX PF 6 1/2

## (undated) DEVICE — GOWN,SIRUS,NON REINFRCD,LARGE,SET IN SL: Brand: MEDLINE

## (undated) DEVICE — SUT PDS 0 CT 36IN DYED Z358T

## (undated) DEVICE — RICH C-SECTION: Brand: MEDLINE INDUSTRIES, INC.

## (undated) DEVICE — SUT GUT CHRM 2/0 SH 27IN G123H